# Patient Record
Sex: MALE | Race: WHITE | NOT HISPANIC OR LATINO | Employment: FULL TIME | URBAN - METROPOLITAN AREA
[De-identification: names, ages, dates, MRNs, and addresses within clinical notes are randomized per-mention and may not be internally consistent; named-entity substitution may affect disease eponyms.]

---

## 2017-01-11 DIAGNOSIS — N52.9 ERECTILE DYSFUNCTION, UNSPECIFIED ERECTILE DYSFUNCTION TYPE: Primary | ICD-10-CM

## 2017-01-11 NOTE — TELEPHONE ENCOUNTER
----- Message from Juliann Sams sent at 1/11/2017 10:21 AM CST -----  Contact: [facility]   _x 1st Request  _  2nd Request  _  3rd Request    Please refill the medication(s) listed below. Please call the patient when the prescription(s) is ready for  at the phone number [phone].    Medication #1 CIALIS 20 mg Tab    Preferred Pharmacy:  [facility] [address] [place] [address] & [address] [address]  Phone: [phone] Fax: [phone]

## 2017-01-23 RX ORDER — TADALAFIL 20 MG/1
TABLET ORAL
Qty: 10 TABLET | Refills: 2 | Status: SHIPPED | OUTPATIENT
Start: 2017-01-23 | End: 2017-03-06 | Stop reason: SDUPTHER

## 2017-01-23 RX ORDER — TADALAFIL 20 MG/1
20 TABLET ORAL DAILY
Qty: 10 TABLET | Refills: 0 | Status: SHIPPED | OUTPATIENT
Start: 2017-01-23 | End: 2017-03-06 | Stop reason: SDUPTHER

## 2017-02-15 DIAGNOSIS — N52.9 ERECTILE DYSFUNCTION, UNSPECIFIED ERECTILE DYSFUNCTION TYPE: Primary | ICD-10-CM

## 2017-02-15 RX ORDER — TADALAFIL 20 MG/1
20 TABLET ORAL DAILY
Qty: 10 TABLET | Refills: 0 | OUTPATIENT
Start: 2017-02-15

## 2017-02-15 RX ORDER — TADALAFIL 20 MG/1
TABLET, FILM COATED ORAL
Qty: 10 TABLET | Refills: 0 | OUTPATIENT
Start: 2017-02-15

## 2017-02-15 NOTE — TELEPHONE ENCOUNTER
----- Message from Gracy Tuttle sent at 2/15/2017  4:32 PM CST -----  Contact: Self  X   1st Request  _  2nd Request  _  3rd Request        Who: JOLANTA MCCABE [[id number]]    Why: Pt states he is returning a call to the clinical team. Please call, thanks!    What Number to Call Back: [phone]    When to Expect a call back: (Before the end of the day)   -- if the call is after 12:00, the call back will be tomorrow.

## 2017-02-15 NOTE — TELEPHONE ENCOUNTER
I left message on recorder that pt should make appt for refills.  He has not seen you in 11 months.

## 2017-02-15 NOTE — TELEPHONE ENCOUNTER
----- Message from Nani Rice sent at 2/15/2017  1:36 PM CST -----  _  1st Request  _  2nd Request  _  3rd Request    Please refill the medication(s) listed below.    Medication #1tadalafil (CIALIS) 20 MG Tab          Preferred Pharmacy:[phone]

## 2017-03-06 ENCOUNTER — OFFICE VISIT (OUTPATIENT)
Dept: UROLOGY | Facility: CLINIC | Age: 47
End: 2017-03-06
Attending: UROLOGY
Payer: COMMERCIAL

## 2017-03-06 VITALS
BODY MASS INDEX: 30.22 KG/M2 | WEIGHT: 228 LBS | HEART RATE: 69 BPM | SYSTOLIC BLOOD PRESSURE: 130 MMHG | DIASTOLIC BLOOD PRESSURE: 82 MMHG | HEIGHT: 73 IN

## 2017-03-06 DIAGNOSIS — N52.9 ERECTILE DYSFUNCTION, UNSPECIFIED ERECTILE DYSFUNCTION TYPE: ICD-10-CM

## 2017-03-06 PROCEDURE — 1160F RVW MEDS BY RX/DR IN RCRD: CPT | Mod: S$GLB,,, | Performed by: NURSE PRACTITIONER

## 2017-03-06 PROCEDURE — 99213 OFFICE O/P EST LOW 20 MIN: CPT | Mod: S$GLB,,, | Performed by: NURSE PRACTITIONER

## 2017-03-06 PROCEDURE — 99999 PR PBB SHADOW E&M-EST. PATIENT-LVL III: CPT | Mod: PBBFAC,,, | Performed by: UROLOGY

## 2017-03-06 RX ORDER — TADALAFIL 20 MG/1
TABLET ORAL
Qty: 10 TABLET | Refills: 11 | Status: SHIPPED | OUTPATIENT
Start: 2017-03-06 | End: 2017-03-28

## 2017-03-06 NOTE — PROGRESS NOTES
"Subjective:      Gonzalo Bustamante is a 46 y.o. male who returns today regarding his ED.      Erectile Dysfunction  Patient complains of erectile dysfunction. Onset of dysfunction was 5 years ago and was gradual in onset. Patient states the nature of difficulty is both attaining and maintaining erection. Full erections occur with Cialis. Libido is not affected. Risk factors for ED include none. Patient denies history of cardiovascular disease and diabetes mellitus. Patient's expectations as to sexual function is to be normal. Previous treatment of ED includes Cialis, which has worked well.       The patient presents today for follow up for his ED. He is doing well on Cialis. He denies any SE from the medication as well as any urinary symptoms.     The following portions of the patient's history were reviewed and updated as appropriate: allergies, current medications, past family history, past medical history, past social history, past surgical history and problem list.    Review of Systems  A comprehensive multipoint review of systems was negative except as otherwise stated in the HPI.     Objective:   Vitals: /82 (BP Location: Right arm, Patient Position: Sitting, BP Method: Automatic)  Pulse 69  Ht 6' 1" (1.854 m)  Wt 103.4 kg (228 lb)  BMI 30.08 kg/m2    Physical Exam   General: alert and oriented, no acute distress  Respiratory: Symmetric expansion, non-labored breathing  Cardiovascular: regular rate and rhythm, no peripheral edema  Abdomen: soft, non distended  Genitourinary: not examined  Rectal: not examined  Skin: normal coloration and turgor, no rashes, no suspicious skin lesions noted  Neuro: no gross deficits  Psych: normal judgment and insight, normal mood/affect and non-anxious    Lab Review   Urinalysis demonstrates- no sample today   Lab Results   Component Value Date    WBC 9.55 01/17/2009    HGB 16.0 01/17/2009    HCT 48.7 01/17/2009    MCV 90.0 01/17/2009     01/17/2009     Lab " Results   Component Value Date    CREATININE 1.2 01/17/2009    BUN 20 01/17/2009     No results found for: PSA, PSADIAG    Imaging   None    Assessment and Plan:   Gonzalo was seen today for other.    Diagnoses and all orders for this visit:    Erectile dysfunction, unspecified erectile dysfunction type  -     tadalafil (CIALIS) 20 MG Tab; Take 1 tablet daily as needed      Plan: Continue cialis, refilled today. Again encouraged patient to stop smoking as this may improve his ED and overall health. Follow up in 1 year.

## 2017-03-09 RX ORDER — TADALAFIL 20 MG/1
TABLET, FILM COATED ORAL
Qty: 10 TABLET | Refills: 0 | Status: SHIPPED | OUTPATIENT
Start: 2017-03-09 | End: 2017-03-27 | Stop reason: SDUPTHER

## 2017-03-28 RX ORDER — TADALAFIL 20 MG/1
TABLET, FILM COATED ORAL
Qty: 10 TABLET | Refills: 0 | Status: SHIPPED | OUTPATIENT
Start: 2017-03-28 | End: 2017-04-12 | Stop reason: SDUPTHER

## 2017-04-13 RX ORDER — TADALAFIL 20 MG/1
TABLET, FILM COATED ORAL
Qty: 10 TABLET | Refills: 0 | Status: SHIPPED | OUTPATIENT
Start: 2017-04-13 | End: 2017-06-01 | Stop reason: SDUPTHER

## 2017-06-01 RX ORDER — TADALAFIL 20 MG/1
TABLET, FILM COATED ORAL
Qty: 10 TABLET | Refills: 0 | Status: SHIPPED | OUTPATIENT
Start: 2017-06-01 | End: 2017-07-24

## 2017-06-28 RX ORDER — TADALAFIL 20 MG/1
TABLET, FILM COATED ORAL
Qty: 10 TABLET | Refills: 0 | Status: SHIPPED | OUTPATIENT
Start: 2017-06-28 | End: 2017-07-24 | Stop reason: SDUPTHER

## 2017-07-24 RX ORDER — TADALAFIL 20 MG/1
TABLET, FILM COATED ORAL
Qty: 10 TABLET | Refills: 0 | Status: SHIPPED | OUTPATIENT
Start: 2017-07-24 | End: 2017-08-13 | Stop reason: SDUPTHER

## 2017-07-26 RX ORDER — LEVOTHYROXINE SODIUM 200 UG/1
TABLET ORAL
Qty: 30 TABLET | Refills: 11 | Status: SHIPPED | OUTPATIENT
Start: 2017-07-26 | End: 2018-09-05 | Stop reason: SDUPTHER

## 2017-08-14 RX ORDER — TADALAFIL 20 MG/1
TABLET, FILM COATED ORAL
Qty: 10 TABLET | Refills: 0 | Status: SHIPPED | OUTPATIENT
Start: 2017-08-14 | End: 2017-09-01 | Stop reason: SDUPTHER

## 2017-09-01 RX ORDER — TADALAFIL 20 MG/1
TABLET, FILM COATED ORAL
Qty: 10 TABLET | Refills: 0 | Status: SHIPPED | OUTPATIENT
Start: 2017-09-01 | End: 2017-09-24 | Stop reason: SDUPTHER

## 2017-09-25 RX ORDER — TADALAFIL 20 MG/1
TABLET, FILM COATED ORAL
Qty: 10 TABLET | Refills: 11 | Status: SHIPPED | OUTPATIENT
Start: 2017-09-25 | End: 2018-03-06 | Stop reason: SDUPTHER

## 2018-03-06 RX ORDER — TADALAFIL 20 MG/1
TABLET, FILM COATED ORAL
Qty: 10 TABLET | Refills: 0 | Status: SHIPPED | OUTPATIENT
Start: 2018-03-06 | End: 2018-03-23 | Stop reason: SDUPTHER

## 2018-03-23 RX ORDER — TADALAFIL 20 MG/1
TABLET, FILM COATED ORAL
Qty: 10 TABLET | Refills: 0 | Status: SHIPPED | OUTPATIENT
Start: 2018-03-23 | End: 2018-04-05 | Stop reason: SDUPTHER

## 2018-04-05 RX ORDER — TADALAFIL 20 MG/1
TABLET, FILM COATED ORAL
Qty: 10 TABLET | Refills: 0 | Status: SHIPPED | OUTPATIENT
Start: 2018-04-05 | End: 2018-04-23 | Stop reason: SDUPTHER

## 2018-04-24 RX ORDER — TADALAFIL 20 MG/1
TABLET, FILM COATED ORAL
Qty: 10 TABLET | Refills: 0 | Status: SHIPPED | OUTPATIENT
Start: 2018-04-24 | End: 2018-05-14 | Stop reason: SDUPTHER

## 2018-05-14 RX ORDER — TADALAFIL 20 MG/1
TABLET, FILM COATED ORAL
Qty: 10 TABLET | Refills: 0 | Status: SHIPPED | OUTPATIENT
Start: 2018-05-14 | End: 2018-07-03 | Stop reason: SDUPTHER

## 2018-05-14 RX ORDER — TADALAFIL 20 MG/1
TABLET, FILM COATED ORAL
Qty: 10 TABLET | Refills: 0 | Status: SHIPPED | OUTPATIENT
Start: 2018-05-14 | End: 2018-08-22 | Stop reason: SDUPTHER

## 2018-06-07 ENCOUNTER — TELEPHONE (OUTPATIENT)
Dept: ENDOCRINOLOGY | Facility: CLINIC | Age: 48
End: 2018-06-07

## 2018-06-07 NOTE — TELEPHONE ENCOUNTER
Dr. Stevenson, would you like to order any labs to be done prior to appointment with you in October 2018?  Attempted to call patient, no answer- left detailed message that, as soon as Dr. Stevenson puts in the ordered- I will schedule and sent an appointment reminder letter.

## 2018-06-07 NOTE — TELEPHONE ENCOUNTER
----- Message from Stephanie Sweet sent at 6/7/2018  9:58 AM CDT -----  Contact: Self  Pt is requesting to speak with Staff regarding an order for blood work for his thyroid condition.  Pt says he has not seen Dr. Stevenson in sometime and is scheduled to be seen in October (first available), but would to have the blood work for Dr. Stevenson to look at, prior to the appt.    He can be reached at [phone].    Thank you.

## 2018-06-18 ENCOUNTER — TELEPHONE (OUTPATIENT)
Dept: ENDOCRINOLOGY | Facility: CLINIC | Age: 48
End: 2018-06-18

## 2018-06-18 DIAGNOSIS — E89.0 HYPOTHYROIDISM FOLLOWING RADIOIODINE THERAPY: Primary | ICD-10-CM

## 2018-07-03 RX ORDER — TADALAFIL 20 MG/1
TABLET, FILM COATED ORAL
Qty: 10 TABLET | Refills: 0 | Status: SHIPPED | OUTPATIENT
Start: 2018-07-03 | End: 2018-07-29 | Stop reason: SDUPTHER

## 2018-07-30 RX ORDER — TADALAFIL 20 MG/1
TABLET, FILM COATED ORAL
Qty: 10 TABLET | Refills: 0 | Status: SHIPPED | OUTPATIENT
Start: 2018-07-30 | End: 2018-08-22 | Stop reason: SDUPTHER

## 2018-08-13 RX ORDER — TADALAFIL 20 MG/1
TABLET, FILM COATED ORAL
Qty: 10 TABLET | Refills: 0 | OUTPATIENT
Start: 2018-08-13

## 2018-08-22 ENCOUNTER — OFFICE VISIT (OUTPATIENT)
Dept: UROLOGY | Facility: CLINIC | Age: 48
End: 2018-08-22
Attending: UROLOGY
Payer: COMMERCIAL

## 2018-08-22 VITALS
HEIGHT: 73 IN | BODY MASS INDEX: 30.22 KG/M2 | HEART RATE: 64 BPM | DIASTOLIC BLOOD PRESSURE: 102 MMHG | WEIGHT: 228 LBS | SYSTOLIC BLOOD PRESSURE: 145 MMHG

## 2018-08-22 DIAGNOSIS — N52.9 ERECTILE DYSFUNCTION, UNSPECIFIED ERECTILE DYSFUNCTION TYPE: Primary | ICD-10-CM

## 2018-08-22 PROCEDURE — 3008F BODY MASS INDEX DOCD: CPT | Mod: CPTII,S$GLB,, | Performed by: UROLOGY

## 2018-08-22 PROCEDURE — 99213 OFFICE O/P EST LOW 20 MIN: CPT | Mod: S$GLB,,, | Performed by: UROLOGY

## 2018-08-22 RX ORDER — TADALAFIL 20 MG/1
20 TABLET ORAL DAILY PRN
Qty: 10 TABLET | Refills: 11 | Status: SHIPPED | OUTPATIENT
Start: 2018-08-22 | End: 2019-08-24 | Stop reason: SDUPTHER

## 2018-08-22 NOTE — PROGRESS NOTES
"Subjective:      Gonzalo Bustamante is a 47 y.o. male who returns today regarding his ED.    Doing well w/ cialis and wishes to continue. No c/o.    The following portions of the patient's history were reviewed and updated as appropriate: allergies, current medications, past family history, past medical history, past social history, past surgical history and problem list.    Review of Systems  A comprehensive multipoint review of systems was negative except as otherwise stated in the HPI.     Objective:   Vitals: BP (!) 145/102 (BP Location: Left arm, Patient Position: Sitting, BP Method: Large (Automatic)) Comment: per pt he had 3 cups of coffee and he doesnt feel any sympts  Pulse 64   Ht 6' 1" (1.854 m)   Wt 103.4 kg (228 lb)   BMI 30.08 kg/m²     Physical Exam   General: alert and oriented, no acute distress  Respiratory: Symmetric expansion, non-labored breathing  Neuro: no gross deficits  Psych: normal judgment and insight, normal mood/affect and non-anxious    Lab Review     Lab Results   Component Value Date    WBC 9.55 01/17/2009    HGB 16.0 01/17/2009    HCT 48.7 01/17/2009    MCV 90.0 01/17/2009     01/17/2009     Lab Results   Component Value Date    CREATININE 1.2 01/17/2009    BUN 20 01/17/2009         Assessment and Plan:   1. Erectile dysfunction, unspecified erectile dysfunction type  -- Continue cialis  -- FU 1 year     "

## 2018-09-05 DIAGNOSIS — E03.9 HYPOTHYROIDISM, UNSPECIFIED TYPE: Primary | ICD-10-CM

## 2018-09-05 RX ORDER — LEVOTHYROXINE SODIUM 200 UG/1
200 TABLET ORAL DAILY
Qty: 30 TABLET | Refills: 3 | Status: SHIPPED | OUTPATIENT
Start: 2018-09-05 | End: 2019-01-08 | Stop reason: SDUPTHER

## 2018-09-05 NOTE — TELEPHONE ENCOUNTER
----- Message from Michaela Joseph sent at 9/5/2018 11:23 AM CDT -----  Contact: Self  .Rx Refill/Request     Is this a Refill or New Rx:  Refill  Rx Name and Strength: SYNTHROID 200 mcg tablet   [facility] with [facility] [place] [address], [phone] Fax: [phone]  Communication Preference:  Additional Information: Pt is out for 3 days

## 2018-10-22 ENCOUNTER — TELEPHONE (OUTPATIENT)
Dept: ENDOCRINOLOGY | Facility: CLINIC | Age: 48
End: 2018-10-22

## 2018-10-22 NOTE — TELEPHONE ENCOUNTER
Called patient, patient answered, then phone disconnected, tried to call back - No answer    ----- Message from Nubia Bronson sent at 10/22/2018 10:01 AM CDT -----  Contact: Pt      ----- Message -----  From: Jayne Bolden  Sent: 10/22/2018   9:45 AM  To: Graham NELA III Staff    Pt is requesting a callback from office need to r/s appt looking to come in the afternoon on a different day     Attempted to r/s no available appt    Pt can be reached at [phone]    Thanks

## 2018-11-07 ENCOUNTER — LAB VISIT (OUTPATIENT)
Dept: LAB | Facility: OTHER | Age: 48
End: 2018-11-07
Attending: INTERNAL MEDICINE
Payer: COMMERCIAL

## 2018-11-07 DIAGNOSIS — E89.0 HYPOTHYROIDISM FOLLOWING RADIOIODINE THERAPY: ICD-10-CM

## 2018-11-07 LAB
T4 FREE SERPL-MCNC: 0.98 NG/DL
TSH SERPL DL<=0.005 MIU/L-ACNC: 5.7 UIU/ML

## 2018-11-07 PROCEDURE — 84439 ASSAY OF FREE THYROXINE: CPT

## 2018-11-07 PROCEDURE — 36415 COLL VENOUS BLD VENIPUNCTURE: CPT

## 2018-11-07 PROCEDURE — 84443 ASSAY THYROID STIM HORMONE: CPT

## 2018-11-14 ENCOUNTER — TELEPHONE (OUTPATIENT)
Dept: ENDOCRINOLOGY | Facility: CLINIC | Age: 48
End: 2018-11-14

## 2018-11-14 NOTE — TELEPHONE ENCOUNTER
----- Message from Bibiana Clark sent at 11/14/2018  3:00 PM CST -----  Contact: Self   Pt is requesting a call back in regards to rescheduling his appt. Pt is very concerned and would like to speak with someone as soon as possible.       Pt can be contacted at [phone]

## 2018-11-16 ENCOUNTER — TELEPHONE (OUTPATIENT)
Dept: ENDOCRINOLOGY | Facility: CLINIC | Age: 48
End: 2018-11-16

## 2018-11-16 NOTE — TELEPHONE ENCOUNTER
----- Message from Maty Marcial sent at 11/16/2018  3:33 PM CST -----  Contact: Self [phone]   PT returned call.

## 2018-12-10 ENCOUNTER — OFFICE VISIT (OUTPATIENT)
Dept: ENDOCRINOLOGY | Facility: CLINIC | Age: 48
End: 2018-12-10
Payer: COMMERCIAL

## 2018-12-10 VITALS
DIASTOLIC BLOOD PRESSURE: 92 MMHG | HEIGHT: 73 IN | BODY MASS INDEX: 32.58 KG/M2 | RESPIRATION RATE: 20 BRPM | HEART RATE: 76 BPM | WEIGHT: 245.81 LBS | SYSTOLIC BLOOD PRESSURE: 138 MMHG

## 2018-12-10 DIAGNOSIS — E89.0 HYPOTHYROIDISM FOLLOWING RADIOIODINE THERAPY: Primary | ICD-10-CM

## 2018-12-10 PROCEDURE — 99204 OFFICE O/P NEW MOD 45 MIN: CPT | Mod: S$GLB,,, | Performed by: INTERNAL MEDICINE

## 2018-12-10 PROCEDURE — 3008F BODY MASS INDEX DOCD: CPT | Mod: CPTII,S$GLB,, | Performed by: INTERNAL MEDICINE

## 2018-12-10 PROCEDURE — 99999 PR PBB SHADOW E&M-EST. PATIENT-LVL III: CPT | Mod: PBBFAC,,, | Performed by: INTERNAL MEDICINE

## 2018-12-10 RX ORDER — CEFDINIR 300 MG/1
CAPSULE ORAL
Refills: 0 | COMMUNITY
Start: 2018-12-08 | End: 2019-02-08 | Stop reason: CLARIF

## 2018-12-10 RX ORDER — BENZONATATE 200 MG/1
CAPSULE ORAL
Refills: 0 | COMMUNITY
Start: 2018-12-08 | End: 2019-02-08 | Stop reason: CLARIF

## 2018-12-10 NOTE — PROGRESS NOTES
"The patient comes in for a thyroid checkup. He is on 200 mcg of thyroid      once a day. He has no complaints, other than he has lost weight since last   year. He has lost approximately 15 pounds. He did this with some increase    exercise and eating healthy, but he did not deliberately try to lose 15      pounds. 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  He was treated with I-131 for hyperthyroidism in 1991.                                                                   WEIGHT GAIN OF 20 POUNDS PAST 2 YEARS.                                                                             PAST MEDICAL HISTORY: See note dated 03/01/06, no significant change.                                                                                     REVIEW OF SYSTEMS: HEENT: Good eyesight and hearing. CARDIOPULMONARY:        Denies chest pain, cough or shortness of breath. GI: Denies nausea,          vomiting, diarrhea, constipation or abdominal pain. : Denies dysuria,      hematuria or nocturia. NEUROMUSCULAR: Denies numbness, tingling or           paresthesias.    All other systems reviewed and are negative except for mild fatigue.                                                                Physical  BMI 32.43                                                                              111.5 kg (245 lb 13 oz), 6' 1" (1.854 m)  76bpm, (!) 138/92  EYES: No eye signs of Graves disease. No scleral icterus. No arcus senilis.  ENT: No lesions on tongue, hard palate, soft palate or pharynx.              NECK: FULLNESS RIGHT LOBE thyromegaly. No neck vein distention. No adenopathy.                HEART: Normal sinus rhythm. No murmurs. No rubs.                             LUNGS: Clear. Percussion normal. No respiratory difficulty.                  ABDOMINAL EXAM: No masses, tenderness or organomegaly.                       EXTREMITIES: No clubbing, cyanosis, edema or tremor.                   "       Results for orders placed or performed in visit on 11/07/18   TSH   Result Value Ref Range    TSH 5.695 (H) 0.400 - 4.000 uIU/mL   T4, free   Result Value Ref Range    Free T4 0.98 0.71 - 1.51 ng/dL                                                                                IMPRESSION: Post [id number] hypothyroidism.   Hx of malignant melanoma resection.                                                                              RECOMMENDATIONS: Thyroid us because of fullness right lower lobe.

## 2018-12-17 ENCOUNTER — HOSPITAL ENCOUNTER (OUTPATIENT)
Dept: RADIOLOGY | Facility: OTHER | Age: 48
Discharge: HOME | End: 2018-12-17
Attending: INTERNAL MEDICINE
Payer: COMMERCIAL

## 2018-12-17 DIAGNOSIS — E89.0 HYPOTHYROIDISM FOLLOWING RADIOIODINE THERAPY: ICD-10-CM

## 2018-12-17 PROCEDURE — 76536 US EXAM OF HEAD AND NECK: CPT | Mod: TC

## 2018-12-17 PROCEDURE — 76536 US EXAM OF HEAD AND NECK: CPT | Mod: 26,,, | Performed by: RADIOLOGY

## 2019-01-08 DIAGNOSIS — E03.9 HYPOTHYROIDISM, UNSPECIFIED TYPE: ICD-10-CM

## 2019-01-08 RX ORDER — LEVOTHYROXINE SODIUM 200 UG/1
TABLET ORAL
Qty: 30 TABLET | Refills: 0 | Status: SHIPPED | OUTPATIENT
Start: 2019-01-08 | End: 2019-02-08 | Stop reason: SDUPTHER

## 2019-02-06 ENCOUNTER — ANESTHESIA EVENT (OUTPATIENT)
Dept: SURGERY | Facility: HOSPITAL | Age: 49
End: 2019-02-06
Payer: COMMERCIAL

## 2019-02-08 ENCOUNTER — HOSPITAL ENCOUNTER (OUTPATIENT)
Dept: PREADMISSION TESTING | Facility: HOSPITAL | Age: 49
Discharge: HOME | End: 2019-02-08
Attending: PODIATRIST
Payer: COMMERCIAL

## 2019-02-08 ENCOUNTER — OFFICE VISIT (OUTPATIENT)
Dept: INTERNAL MEDICINE | Facility: CLINIC | Age: 49
End: 2019-02-08
Payer: COMMERCIAL

## 2019-02-08 VITALS
WEIGHT: 245.94 LBS | DIASTOLIC BLOOD PRESSURE: 89 MMHG | OXYGEN SATURATION: 98 % | HEART RATE: 78 BPM | BODY MASS INDEX: 32.6 KG/M2 | SYSTOLIC BLOOD PRESSURE: 125 MMHG | HEIGHT: 73 IN

## 2019-02-08 DIAGNOSIS — E03.9 HYPOTHYROIDISM, UNSPECIFIED TYPE: ICD-10-CM

## 2019-02-08 DIAGNOSIS — Z01.818 PREOPERATIVE EXAMINATION: Primary | ICD-10-CM

## 2019-02-08 DIAGNOSIS — M86.271 SUBACUTE OSTEOMYELITIS OF RIGHT FOOT: ICD-10-CM

## 2019-02-08 PROCEDURE — 99203 PR OFFICE/OUTPT VISIT, NEW, LEVL III, 30-44 MIN: ICD-10-PCS | Mod: S$GLB,,, | Performed by: FAMILY MEDICINE

## 2019-02-08 PROCEDURE — 99999 PR PBB SHADOW E&M-EST. PATIENT-LVL III: CPT | Mod: PBBFAC,,, | Performed by: FAMILY MEDICINE

## 2019-02-08 PROCEDURE — 99999 PR PBB SHADOW E&M-EST. PATIENT-LVL III: ICD-10-PCS | Mod: PBBFAC,,, | Performed by: FAMILY MEDICINE

## 2019-02-08 PROCEDURE — 3008F PR BODY MASS INDEX (BMI) DOCUMENTED: ICD-10-PCS | Mod: CPTII,S$GLB,, | Performed by: FAMILY MEDICINE

## 2019-02-08 PROCEDURE — 99203 OFFICE O/P NEW LOW 30 MIN: CPT | Mod: S$GLB,,, | Performed by: FAMILY MEDICINE

## 2019-02-08 PROCEDURE — 3008F BODY MASS INDEX DOCD: CPT | Mod: CPTII,S$GLB,, | Performed by: FAMILY MEDICINE

## 2019-02-08 RX ORDER — SODIUM CHLORIDE, SODIUM LACTATE, POTASSIUM CHLORIDE, CALCIUM CHLORIDE 600; 310; 30; 20 MG/100ML; MG/100ML; MG/100ML; MG/100ML
INJECTION, SOLUTION INTRAVENOUS CONTINUOUS
Status: CANCELLED | OUTPATIENT
Start: 2019-02-08

## 2019-02-08 RX ORDER — CIPROFLOXACIN 500 MG/1
500 TABLET ORAL
COMMUNITY
End: 2021-09-29

## 2019-02-08 RX ORDER — LIDOCAINE HYDROCHLORIDE 10 MG/ML
1 INJECTION, SOLUTION EPIDURAL; INFILTRATION; INTRACAUDAL; PERINEURAL ONCE
Status: CANCELLED | OUTPATIENT
Start: 2019-02-08 | End: 2019-02-08

## 2019-02-08 RX ORDER — AMOXICILLIN AND CLAVULANATE POTASSIUM 875; 125 MG/1; MG/1
1 TABLET, FILM COATED ORAL
COMMUNITY
End: 2021-09-29

## 2019-02-08 NOTE — DISCHARGE INSTRUCTIONS
Your surgery is scheduled for 2/11/19.    Please report to Outpatient Surgery Intake Office on the 2nd FLOOR at 5:30 a.m.          INSTRUCTIONS IMPORTANT!!!  ¨ Do not eat or drink after 12 midnight-including water. OK to brush teeth, no   gum, candy or mints!    ¨ Take only these medicines with a small swallow of water-morning of surgery: Synthroid          ____  No powder, lotions or creams to surgical area.  ____  Please remove all jewelry, including piercings and leave at home.  ____  No money or valuables needed. Please leave at home.  ____  Please bring any documents given by your doctor.  ____  If going home the same day, arrange for a ride home. You will not be able to             drive if Anesthesia was used.  ____  Wear loose fitting clothing. Allow for dressings, bandages.  ____  Stop Aspirin, Ibuprofen, Motrin and Aleve at least 3-5 days before surgery, unless otherwise instructed by your doctor, or the nurse.   You MAY use Tylenol/acetaminophen until day of surgery.  ____  Wash the surgical area with Hibiclens the night before surgery, and again the             morning of surgery.  Be sure to rinse hibiclens off completely (if instructed by   nurse).  ____  If you take diabetic medication, do not take am of surgery unless instructed by Doctor.  ____  Call MD for temperature above 101 degrees or any other signs of infection such as Urinary (bladder) infection, Upper respiratory infection, skin boils, etc.  ____ Stop taking any Fish Oil supplement or any Vitamins that contain Vitamin E at least 5 days prior to surgery.  ____ Do Not wear your contact lenses the day of your procedure.  You may wear your glasses.      ____Do not shave for 3 days prior to surgery.  ____ Practice Good hand washing before, during, and after procedure.      I have read or had read and explained to me, and understand the above information.  Additional comments or instructions:  For additional questions call [phone]      Pre-Op  Bathing Instructions    Before surgery, you can play an important role in your own health.    Because skin is not sterile, we need to be sure that your skin is as free of germs as possible. By following the instructions below, you can reduce the number of germs on your skin before surgery.    IMPORTANT: You will need to shower with a special soap called Hibiclens*, available at any pharmacy.  If you are allergic to Chlorhexidine (the antiseptic in Hibiclens), use an antibacterial soap such as Dial Soap for your preoperative shower.  You will shower with Hibiclens both the night before your surgery and the morning of your surgery.  Do not use Hibiclens on the head, face or genitals to avoid injury to those areas.    STEP #1: THE NIGHT BEFORE YOUR SURGERY     1. Do not shave the area of your body where your surgery will be performed.  2. Shower and wash your hair and body as usual with your normal soap and shampoo.  3. Rinse your hair and body thoroughly after you shower to remove all soap residue.  4. With your hand, apply one packet of Hibiclens soap to the surgical site.   5. Wash the site gently for five (5) minutes. Do not scrub your skin too hard.   6. Do not wash with your regular soap after Hibiclens is used.  7. Rinse your body thoroughly.  8. Pat yourself dry with a clean, soft towel.  9. Do not use lotion, cream, or powder.  10. Wear clean clothes.    STEP #2: THE MORNING OF YOUR SURGERY     1. Repeat Step #1.    * Not to be used by people allergic to Chlorhexidine.

## 2019-02-08 NOTE — ANESTHESIA PREPROCEDURE EVALUATION
02/08/2019  Gonzalo Bustamante is a 48 y.o., male scheduled for right 3rd toe I&D with antibiotic implant insertion and bone biopsy under MAC on 2/11/19.    Needs PCP H&P.    Past Medical History:   Diagnosis Date    Graves disease     Hypothyroidism      Past Surgical History:   Procedure Laterality Date    BUNIONECTOMY Bilateral     SKIN CANCER EXCISION      melanoma back 2012    WISDOM TOOTH EXTRACTION       Anesthesia Evaluation    I have reviewed the Patient Summary Reports.    I have reviewed the Nursing Notes.   I have reviewed the Medications.     Review of Systems  Anesthesia Hx:  No problems with previous Anesthesia    Social:  Smoker, Social Alcohol Use    Hematology/Oncology:  Hematology Normal        Cardiovascular:  Cardiovascular Normal Exercise tolerance: good   Denies Angina.        Pulmonary:  Pulmonary Normal    Renal/:  Renal/ Normal     Hepatic/GI:  Hepatic/GI Normal Denies Liver Disease.    Neurological:  Neurology Normal Denies Seizures.    Endocrine:   Hypothyroidism        Physical Exam  General:  Well nourished    Airway/Jaw/Neck:  Airway Findings: Mouth Opening: Normal Tongue: Normal  General Airway Assessment: Adult  Mallampati: II       Chest/Lungs:  Chest/Lungs Findings: Clear to auscultation, Normal Respiratory Rate     Heart/Vascular:  Heart Findings: Rate: Normal  Rhythm: Regular Rhythm  Sounds: Normal  Heart murmur: negative       Mental Status:  Mental Status Findings:  Cooperative, Alert and Oriented         Anesthesia Plan  Type of Anesthesia, risks & benefits discussed:  Anesthesia Type:  MAC, regional, general  Patient's Preference:   Intra-op Monitoring Plan: standard ASA monitors  Intra-op Monitoring Plan Comments:   Post Op Pain Control Plan: per primary service following discharge from PACU  Post Op Pain Control Plan Comments:   Induction:   IV  Beta  Blocker:  Patient is not currently on a Beta-Blocker (No further documentation required).       Informed Consent: Patient understands risks and agrees with Anesthesia plan.  Questions answered. Anesthesia consent signed with patient.  ASA Score: 2     Day of Surgery Review of History & Physical:     H&P completed by Anesthesiologist.   Anesthesia Plan Notes:           Ready For Surgery From Anesthesia Perspective.

## 2019-02-08 NOTE — PROGRESS NOTES
"[facility]  Clinic Note      Subjective:       Patient ID: Gonzalo Bustamante is a 48 y.o. male.    Chief Complaint: Pre-op Exam    Patient is here today for preoperative clearance.  He developed an infection on the 3rd digit of his right foot, which subsequent evaluation has revealed progression to osteomyelitis.  He denies any active fever.  He is taking Augmentin and Cipro, with good improvement noted on these antibiotics.  He describes his condition has improved such that surgery is planned for 3 days from now at the [facility].  There is plan for potential bony debridement but hopefully will preserve the toe.  He had bloodwork recently showing normal blood sugars and renal function.  He is chronic hypothyroid, well-controlled on Synthroid and followed regularly by his Endocrinologist.  He has no history of CAD/MI, and notes he is able to climb a flight of stairs without any cardiopulmonary symptoms.      Review of Systems   Constitutional: Negative for fatigue and fever.   HENT: Negative for congestion, rhinorrhea and sore throat.    Respiratory: Negative for cough and shortness of breath.    Cardiovascular: Negative for chest pain and palpitations.   Gastrointestinal: Negative for abdominal pain, diarrhea, nausea and vomiting.   Skin: Positive for rash and wound.   Neurological: Negative for dizziness, syncope and headaches.       Objective:      /89   Pulse 78   Ht 6' 1" (1.854 m)   Wt 111.5 kg (245 lb 14.8 oz)   SpO2 98%   BMI 32.45 kg/m²   Physical Exam   Constitutional: He is oriented to person, place, and time. He appears well-developed and well-nourished. No distress.   HENT:   Head: Normocephalic and atraumatic.   Right Ear: Tympanic membrane and ear canal normal. Tympanic membrane is not erythematous. No middle ear effusion.   Left Ear: Tympanic membrane and ear canal normal. Tympanic membrane is not erythematous.  No middle ear effusion.   Nose: Nose normal. No " mucosal edema or rhinorrhea.   Mouth/Throat: Oropharynx is clear and moist and mucous membranes are normal.   Eyes: Conjunctivae are normal. Pupils are equal, round, and reactive to light.   Neck: Normal range of motion. No thyromegaly present.   Cardiovascular: Normal rate and regular rhythm.   No murmur heard.  Pulmonary/Chest: Effort normal and breath sounds normal. No respiratory distress. He has no wheezes. He has no rales.   Abdominal: Soft. Bowel sounds are normal. He exhibits no distension. There is no tenderness. There is no guarding.   Musculoskeletal: Normal range of motion. He exhibits no edema.   Lymphadenopathy:     He has no cervical adenopathy.   Neurological: He is alert and oriented to person, place, and time. No cranial nerve deficit.   Skin: Skin is warm and dry. Rash noted.   Focal wound and infection on medial aspect of right 3rd toe, no proximal erythema or swelling, some surrounding induration and maceration, no active purulence   Psychiatric: He has a normal mood and affect. His behavior is normal.   Nursing note and vitals reviewed.      Assessment:       1. Preoperative examination    2. Subacute osteomyelitis of right foot        Plan:     1. Preoperative examination  2. Subacute osteomyelitis of right foot  - health history reviewed  - normal exam today aside from toe infection, at baseline health, no recent fever  - continue antibiotic as directed  - preoperative form reviewed, has indicated surgery in 3 days with MAC anesthesia  - recent labs showing good renal function and no diabetes  - OK for upcoming surgery, no additional diagnostics required  - form completed for surgeon, will fax back along with a copy of today's note  - questions answered, patient is comfortable with this plan and was encouraged to call the office for any additional needs or concerns     - Follow-up if symptoms worsen or fail to improve.     Frank Ramos MD  2/8/2019

## 2019-02-08 NOTE — PRE-PROCEDURE INSTRUCTIONS
Spoke with Zoe at Dr. Cifuentes office to see if pt is still having surgery on Monday 2/11.  She states she spoke with the pt and told him she would give him till 3:30pm today to obtain medical clearance or he will be cancelled.  Zoe will let us know this evening.

## 2019-02-08 NOTE — PRE-PROCEDURE INSTRUCTIONS
"Pt states he faxed the clearance forms to his "PCP", Dr. Sheets who is an [facility] endocrinologist.  There are no encounters or notes from Dr. Sheets office stating that the pt will need to be seen or is cleared for upcoming surgery.  Pt also states he actually does not have a PCP.  Pt states he did not attempt to contact by phone Dr. Sehets office to obtain clearance or visit for clearance.      Zoe at Dr. Cifuentes office notified of the above and states that the pt requires medical clearance and cannot proceed with procedure until clearance is obtained.  Instructed me to have the pt call her as soon as he is done with anesthesia NP to try to obtain a visit for clearance.  Pt instructed that he needs to call Dr. Cifuentes office and speak with Zoe to coordinate clearance for upcoming surgery.  Pt verbalized understanding.  "

## 2019-02-08 NOTE — PRE-PROCEDURE INSTRUCTIONS
Shila Lopez  [phone]    Allergies, medical, surgical, family and psychosocial histories reviewed with patient. Periop plan of care reviewed. Preop instructions given, including medications to take and to hold. Hibiclens soap and instructions on use given. Time allotted for questions to be addressed.  Patient verbalized understanding.

## 2019-02-11 ENCOUNTER — HOSPITAL ENCOUNTER (OUTPATIENT)
Facility: HOSPITAL | Age: 49
Discharge: HOME | End: 2019-02-11
Attending: PODIATRIST | Admitting: PODIATRIST
Payer: COMMERCIAL

## 2019-02-11 ENCOUNTER — ANESTHESIA (OUTPATIENT)
Dept: SURGERY | Facility: HOSPITAL | Age: 49
End: 2019-02-11
Payer: COMMERCIAL

## 2019-02-11 VITALS
BODY MASS INDEX: 32.47 KG/M2 | DIASTOLIC BLOOD PRESSURE: 103 MMHG | RESPIRATION RATE: 16 BRPM | SYSTOLIC BLOOD PRESSURE: 168 MMHG | TEMPERATURE: 98 F | OXYGEN SATURATION: 100 % | WEIGHT: 245 LBS | HEIGHT: 73 IN | HEART RATE: 56 BPM

## 2019-02-11 DIAGNOSIS — M86.171 OSTEOMYELITIS OF FOOT, RIGHT, ACUTE: Primary | ICD-10-CM

## 2019-02-11 PROCEDURE — 37000009 HC ANESTHESIA EA ADD 15 MINS: Performed by: PODIATRIST

## 2019-02-11 PROCEDURE — 88305 TISSUE EXAM BY PATHOLOGIST: CPT | Performed by: PATHOLOGY

## 2019-02-11 PROCEDURE — S0020 INJECTION, BUPIVICAINE HYDRO: HCPCS | Performed by: PODIATRIST

## 2019-02-11 PROCEDURE — 88307 TISSUE EXAM BY PATHOLOGIST: CPT | Performed by: PATHOLOGY

## 2019-02-11 PROCEDURE — 88305 TISSUE EXAM BY PATHOLOGIST: CPT | Mod: 26,,, | Performed by: PATHOLOGY

## 2019-02-11 PROCEDURE — 71000015 HC POSTOP RECOV 1ST HR: Performed by: PODIATRIST

## 2019-02-11 PROCEDURE — 37000008 HC ANESTHESIA 1ST 15 MINUTES: Performed by: PODIATRIST

## 2019-02-11 PROCEDURE — 88305 TISSUE SPECIMEN TO PATHOLOGY - SURGERY: ICD-10-PCS | Mod: 26,,, | Performed by: PATHOLOGY

## 2019-02-11 PROCEDURE — 87176 TISSUE HOMOGENIZATION CULTR: CPT

## 2019-02-11 PROCEDURE — 88307 TISSUE EXAM BY PATHOLOGIST: CPT | Mod: 26,,, | Performed by: PATHOLOGY

## 2019-02-11 PROCEDURE — 36000707: Performed by: PODIATRIST

## 2019-02-11 PROCEDURE — 87075 CULTR BACTERIA EXCEPT BLOOD: CPT

## 2019-02-11 PROCEDURE — 87116 MYCOBACTERIA CULTURE: CPT

## 2019-02-11 PROCEDURE — 36000706: Performed by: PODIATRIST

## 2019-02-11 PROCEDURE — 88311 TISSUE SPECIMEN TO PATHOLOGY - SURGERY: ICD-10-PCS | Mod: 26,,, | Performed by: PATHOLOGY

## 2019-02-11 PROCEDURE — 25000003 PHARM REV CODE 250: Performed by: PODIATRIST

## 2019-02-11 PROCEDURE — 63600175 PHARM REV CODE 636 W HCPCS: Performed by: NURSE ANESTHETIST, CERTIFIED REGISTERED

## 2019-02-11 PROCEDURE — 88307 TISSUE SPECIMEN TO PATHOLOGY - SURGERY: ICD-10-PCS | Mod: 26,,, | Performed by: PATHOLOGY

## 2019-02-11 PROCEDURE — 88311 DECALCIFY TISSUE: CPT | Mod: 26,,, | Performed by: PATHOLOGY

## 2019-02-11 PROCEDURE — 87070 CULTURE OTHR SPECIMN AEROBIC: CPT

## 2019-02-11 PROCEDURE — 71000016 HC POSTOP RECOV ADDL HR: Performed by: PODIATRIST

## 2019-02-11 PROCEDURE — 25000003 PHARM REV CODE 250: Performed by: NURSE PRACTITIONER

## 2019-02-11 PROCEDURE — 87206 SMEAR FLUORESCENT/ACID STAI: CPT

## 2019-02-11 RX ORDER — BACITRACIN 50000 [IU]/1
INJECTION, POWDER, FOR SOLUTION INTRAMUSCULAR
Status: DISCONTINUED | OUTPATIENT
Start: 2019-02-11 | End: 2019-02-11 | Stop reason: HOSPADM

## 2019-02-11 RX ORDER — LIDOCAINE HYDROCHLORIDE 10 MG/ML
1 INJECTION, SOLUTION EPIDURAL; INFILTRATION; INTRACAUDAL; PERINEURAL ONCE
Status: DISCONTINUED | OUTPATIENT
Start: 2019-02-11 | End: 2019-02-11 | Stop reason: HOSPADM

## 2019-02-11 RX ORDER — HYDROMORPHONE HYDROCHLORIDE 2 MG/ML
0.5 INJECTION, SOLUTION INTRAMUSCULAR; INTRAVENOUS; SUBCUTANEOUS EVERY 5 MIN PRN
Status: CANCELLED | OUTPATIENT
Start: 2019-02-11

## 2019-02-11 RX ORDER — HYDROMORPHONE HYDROCHLORIDE 2 MG/ML
0.2 INJECTION, SOLUTION INTRAMUSCULAR; INTRAVENOUS; SUBCUTANEOUS EVERY 5 MIN PRN
Status: CANCELLED | OUTPATIENT
Start: 2019-02-11

## 2019-02-11 RX ORDER — SODIUM CHLORIDE, SODIUM LACTATE, POTASSIUM CHLORIDE, CALCIUM CHLORIDE 600; 310; 30; 20 MG/100ML; MG/100ML; MG/100ML; MG/100ML
INJECTION, SOLUTION INTRAVENOUS CONTINUOUS
Status: DISCONTINUED | OUTPATIENT
Start: 2019-02-11 | End: 2019-02-11 | Stop reason: HOSPADM

## 2019-02-11 RX ORDER — OXYCODONE AND ACETAMINOPHEN 5; 325 MG/1; MG/1
1 TABLET ORAL
Status: CANCELLED | OUTPATIENT
Start: 2019-02-11

## 2019-02-11 RX ORDER — LIDOCAINE HYDROCHLORIDE 10 MG/ML
INJECTION, SOLUTION EPIDURAL; INFILTRATION; INTRACAUDAL; PERINEURAL
Status: DISCONTINUED | OUTPATIENT
Start: 2019-02-11 | End: 2019-02-11 | Stop reason: HOSPADM

## 2019-02-11 RX ORDER — ONDANSETRON 8 MG/1
8 TABLET, ORALLY DISINTEGRATING ORAL EVERY 8 HOURS PRN
Status: DISCONTINUED | OUTPATIENT
Start: 2019-02-11 | End: 2019-02-11 | Stop reason: HOSPADM

## 2019-02-11 RX ORDER — LIDOCAINE HCL/PF 100 MG/5ML
SYRINGE (ML) INTRAVENOUS
Status: DISCONTINUED | OUTPATIENT
Start: 2019-02-11 | End: 2019-02-11

## 2019-02-11 RX ORDER — PROPOFOL 10 MG/ML
VIAL (ML) INTRAVENOUS
Status: DISCONTINUED | OUTPATIENT
Start: 2019-02-11 | End: 2019-02-11

## 2019-02-11 RX ORDER — CEFAZOLIN SODIUM 1 G/3ML
INJECTION, POWDER, FOR SOLUTION INTRAMUSCULAR; INTRAVENOUS
Status: DISCONTINUED | OUTPATIENT
Start: 2019-02-11 | End: 2019-02-11

## 2019-02-11 RX ORDER — BUPIVACAINE HYDROCHLORIDE 5 MG/ML
INJECTION, SOLUTION EPIDURAL; INTRACAUDAL
Status: DISCONTINUED | OUTPATIENT
Start: 2019-02-11 | End: 2019-02-11 | Stop reason: HOSPADM

## 2019-02-11 RX ORDER — HYDROCODONE BITARTRATE AND ACETAMINOPHEN 5; 325 MG/1; MG/1
1 TABLET ORAL EVERY 4 HOURS PRN
Status: DISCONTINUED | OUTPATIENT
Start: 2019-02-11 | End: 2019-02-11 | Stop reason: HOSPADM

## 2019-02-11 RX ORDER — FENTANYL CITRATE 50 UG/ML
INJECTION, SOLUTION INTRAMUSCULAR; INTRAVENOUS
Status: DISCONTINUED | OUTPATIENT
Start: 2019-02-11 | End: 2019-02-11

## 2019-02-11 RX ORDER — PROPOFOL 10 MG/ML
VIAL (ML) INTRAVENOUS CONTINUOUS PRN
Status: DISCONTINUED | OUTPATIENT
Start: 2019-02-11 | End: 2019-02-11

## 2019-02-11 RX ADMIN — FENTANYL CITRATE 50 MCG: 50 INJECTION, SOLUTION INTRAMUSCULAR; INTRAVENOUS at 07:02

## 2019-02-11 RX ADMIN — PROPOFOL 80 MG: 10 INJECTION, EMULSION INTRAVENOUS at 07:02

## 2019-02-11 RX ADMIN — CEFAZOLIN 2 G: 330 INJECTION, POWDER, FOR SOLUTION INTRAMUSCULAR; INTRAVENOUS at 07:02

## 2019-02-11 RX ADMIN — LIDOCAINE HYDROCHLORIDE 60 MG: 20 INJECTION, SOLUTION INTRAVENOUS at 07:02

## 2019-02-11 RX ADMIN — SODIUM CHLORIDE, SODIUM LACTATE, POTASSIUM CHLORIDE, AND CALCIUM CHLORIDE: .6; .31; .03; .02 INJECTION, SOLUTION INTRAVENOUS at 07:02

## 2019-02-11 RX ADMIN — PROPOFOL 50 MCG/KG/MIN: 10 INJECTION, EMULSION INTRAVENOUS at 07:02

## 2019-02-11 NOTE — ANESTHESIA POSTPROCEDURE EVALUATION
"Anesthesia Post Evaluation    Patient: Gonzalo Bustamante    Procedure(s) Performed: Procedure(s) (LRB):  I&D W/BONE DEBRIDEMENT - 3RD TOE (Right)  BONE BIOPSY (Right)  AMPUTATION, TOE (Right)    Final Anesthesia Type: MAC  Patient location during evaluation: OPS  Patient participation: Yes- Able to Participate  Level of consciousness: awake and alert and oriented  Post-procedure vital signs: reviewed and stable  Pain management: adequate  Airway patency: patent  PONV status at discharge: No PONV  Anesthetic complications: no      Cardiovascular status: hemodynamically stable  Respiratory status: unassisted, spontaneous ventilation and room air  Hydration status: euvolemic  Follow-up not needed.        Visit Vitals  BP (!) 142/100 (BP Location: Left arm, Patient Position: Lying)   Pulse 65   Temp 36.6 °C (97.9 °F) (Skin)   Resp 18   Ht 6' 1" (1.854 m)   Wt 111.1 kg (245 lb)   SpO2 96%   BMI 32.32 kg/m²       Pain/Francois Score: No Data Recorded      "

## 2019-02-11 NOTE — TRANSFER OF CARE
"Anesthesia Transfer of Care Note    Patient: Gonzalo Bustamante    Procedure(s) Performed: Procedure(s) (LRB):  I&D W/BONE DEBRIDEMENT - 3RD TOE (Right)  BONE BIOPSY (Right)  AMPUTATION, TOE (Right)    Patient location: OPS    Anesthesia Type: MAC    Transport from OR: Transported from OR on room air with adequate spontaneous ventilation    Post pain: adequate analgesia    Post assessment: no apparent anesthetic complications and tolerated procedure well    Post vital signs: stable    Level of consciousness: awake, alert and oriented    Nausea/Vomiting: no nausea/vomiting    Complications: none    Transfer of care protocol was followed      Last vitals:   Visit Vitals  BP (!) 142/100 (BP Location: Left arm, Patient Position: Lying)   Pulse 65   Temp 36.6 °C (97.9 °F) (Skin)   Resp 18   Ht 6' 1" (1.854 m)   Wt 111.1 kg (245 lb)   SpO2 96%   BMI 32.32 kg/m²     "

## 2019-02-11 NOTE — BRIEF OP NOTE
[facility]  Brief Operative Note     SUMMARY     Surgery Date: 2/11/2019     Surgeon(s) and Role:     * Madi Cifuentes DPM - Primary    Assisting Surgeon: None    Pre-op Diagnosis:  Osteomyelitis of ankle and foot [M86.9]      Post-op Diagnosis:  Post-Op Diagnosis Codes:     * Osteomyelitis of ankle and foot [M86.9]    Procedure(s) (LRB):  I&D W/BONE DEBRIDEMENT - 3RD TOE (Right)  BONE BIOPSY (Right)  AMPUTATION, TOE (Right)    Anesthesia: General/MAC with local    Description of the findings of the procedure: Osteomyelitis if the right 3rd digit middle phalanx     Findings/Key Components: Osteolysis of the right 3rd digit middle phalanx.    Estimated Blood Loss: * No values recorded between 2/11/2019  7:47 AM and 2/11/2019  8:28 AM *         Specimens:   Specimen (12h ago, onward)    Start     Ordered    02/11/19 0757  Specimen to Pathology - Surgery  Once     Comments:  Pre-op Diagnosis: Osteomyelitis of ankle and foot [M86.9]Varicose ulcer [I83.009, L97.909]Post-op Diagnosis: SAMEProcedure(s):I&D W/BONE DEBRIDEMENT - 3RD TOEINSERTION-IMPLANT ANTIBIOTIC PLACEMENTBONE BIOPSY Number of specimens: 3Name of specimens: 1. RIGHT 3RD TOE2. RIGHT 3RD TOE CLEAN MARGIN FOR BONE BIOPSY3. RIGHT 3RD TOE BONE BIOPSY FOR OSTEOMYELITIS     Start Status   02/11/19 0757 Collected (02/11/19 0825)       02/11/19 0824          Discharge Note    SUMMARY     Admit Date: 2/11/2019    Discharge Date and Time:  02/11/2019 8:30 AM    Hospital Course (synopsis of major diagnoses, care, treatment, and services provided during the course of the hospital stay): Patient was placed in outpatient surgery for right 3rd digit partial amputation due to osteomyelitis. Patient underwent procedure, tolerated procedure well. Patient was discharged with vital signs stable and neurovascular status intact.     Final Diagnosis: Post-Op Diagnosis Codes:     * Osteomyelitis of ankle and foot [M86.9]     * Varicose ulcer [I83.009,  L97.662]    Disposition: Home or Self Care    Follow Up/Patient Instructions:     Medications:  Reconciled Home Medications:      Medication List      ASK your doctor about these medications    AUGMENTIN 875-125 mg per tablet  Generic drug:  amoxicillin-clavulanate 875-125mg  Take 1 tablet by mouth every 12 (twelve) hours.     ciprofloxacin HCl 500 MG tablet  Commonly known as:  CIPRO  Take 500 mg by mouth every 12 (twelve) hours.     levothyroxine 200 MCG tablet  Commonly known as:  SYNTHROID  TAKE 1 TABLET BY MOUTH EVERY DAY     tadalafil 20 MG Tab  Commonly known as:  CIALIS  Take 1 tablet (20 mg total) by mouth daily as needed.          No discharge procedures on file.

## 2019-02-11 NOTE — PLAN OF CARE
PT from OR transported with stretcher, pt aaox3, vss, wife at bedside. Pt denies pain. Pt surgical wound cdi. Post op instruction reviewed with pt and pt's family. Pt and pt's family verbalize understanding. Safety precautions maintained. Call bell in reach.  Bed locked and in lowest position. Instructed pt to call for assistance. Pt verbalizes understanding.

## 2019-02-11 NOTE — OP NOTE
DATE OF PROCEDURE:  02/13/2019.    SURGEON:  Madi Cifuentes DPM.    ASSISTANT: none    PREOPERATIVE DIAGNOSIS:  Right third digit osteomyelitis.    POSTOPERATIVE DIAGNOSIS:  Right third digit osteomyelitis.    PROCEDURES PERFORMED:  Right third digit amputation with a fillet skin toe flap.    ANESTHESIA:  Monitored anesthesia care with a local block consisting of 1:1 mix   of lidocaine 1% plain/0.5% Marcaine plain.    HEMOSTASIS:  Pneumatic ankle tourniquet inflated to 250 mmHg x12 minutes.    ESTIMATED BLOOD LOSS:  Less than 5 mL.    MATERIALS:  3-0 Vicryl, 4-0 Prolene, 2-0 Vicryl, sterile dressing.    PATHOLOGY:  Bone culture and biopsy.    PROCEDURE IN DETAIL:  The patient was identified in the preoperative holding, at   which time the correct operative limb was signed.  The consent was reviewed in   detail.  Anesthesia met the patient to discussed administration of monitored   anesthesia care.  The patient was then transferred to the Operating Room, placed   on the operating room table in supine position with all bony prominences well   padded.  A time-out was then taken to identify the correct patient and correct   limb and the correct operative procedure to be performed.  All those in the room   did agree.  Pneumatic ankle tourniquet was then applied around the right ankle   preset at 250 mmHg.  Anesthesia administered monitored anesthesia care.  A local   block was then obtained about a right third digit.  The right foot was then   prepped and draped in a normal sterile surgical fashion.  An Esmarch was then   utilized to exsanguinate the right foot and the pneumatic ankle tourniquet was   then inflated to 250 mmHg.  Next, using a #15 blade, a racquet-type incision was   then performed along the dorsal aspect of the right third digit.  The incision   was then deepened through subcutaneous tissue and down to the level of bone.    All bleeders were then ligated.    The distal aspect of the right third digit was  then disarticulated at the level   of the proximal interphalangeal joint.  A #15 blade was then utilized to reflect   the periosteum from the distal aspect of the proximal phalanx.  A sagittal saw   was then utilized to resect the proximal phalanx.  Bone cultures and bone biopsy   was then taken from the distal aspect of the digit that was previously removed.    Copious irrigation was then performed.    A rongeur was then utilized to obtain a clean margin.  That was then sent for a   bone biopsy.    The pneumatic ankle tourniquet was then deflated.  Adequate hemostasis was then   achieved.    The deep layers were then reapproximated with 2-0 Vicryl.  A fillet toe flap was   then utilized to reapproximate the subcutaneous tissue with 3-0 Vicryl,   followed by reapproximation of the skin with 4-0 Prolene.  Betadine-soaked   Adaptic was then applied.  A sterile dressing was then applied to the surgical   foot.  The patient was then awoken from anesthesia without complication.  The   patient was transferred to the PACU with vital signs stable and neurovascular   status intact.      FERNANDO/IN  dd: 02/13/2019 14:06:45 (CST)  td: 02/13/2019 14:54:16 (CST)  Doc ID   #[id number]  Job ID #[id number]    CC:

## 2019-02-11 NOTE — ANESTHESIA POSTPROCEDURE EVALUATION
"Anesthesia Post Evaluation    Patient: Gonzalo Bustamante    Procedure(s) Performed: Procedure(s) (LRB):  I&D W/BONE DEBRIDEMENT - 3RD TOE (Right)  BONE BIOPSY (Right)  AMPUTATION, TOE (Right)    Final Anesthesia Type: MAC  [facility]  Patient participation: Yes- Able to Participate  Level of consciousness: awake and alert  Post-procedure vital signs: reviewed and stable  Pain management: adequate  Airway patency: patent  PONV status at discharge: No PONV  Anesthetic complications: no      Cardiovascular status: blood pressure returned to baseline  Respiratory status: unassisted  Hydration status: euvolemic  Follow-up not needed.        Visit Vitals  BP (!) 142/100 (BP Location: Left arm, Patient Position: Lying)   Pulse 65   Temp 36.6 °C (97.9 °F) (Skin)   Resp 18   Ht 6' 1" (1.854 m)   Wt 111.1 kg (245 lb)   SpO2 96%   BMI 32.32 kg/m²       Pain/Francois Score: No Data Recorded      "

## 2019-02-11 NOTE — PLAN OF CARE
Dr. Cifuentes at bedside speaking with pt and family. Bedside xray done. Pt and md stated pt received prescriptions at preop md appt. Will continue to monitor.

## 2019-02-11 NOTE — DISCHARGE INSTRUCTIONS
ANESTHESIA  -For the first 24 hours after surgery:  Do not drive, use heavy equipment, make important decisions, or drink alcohol  -It is normal to feel sleepy for several hours.  Rest until you are more awake.  -Have someone stay with you, if needed.  They can watch for problems and help keep you safe.  -Some possible post anesthesia side effects include: nausea and vomiting, sore throat and hoarseness, sleepiness, and dizziness.    PAIN  -If you have pain after surgery, pain medicine will help you feel better.  Take it as directed, before pain becomes severe.  Most pain relievers taken by mouth need at least 20-30 minutes to start working.  -Do not drive or drink alcohol while taking pain medicine.  -Pain medication can upset your stomach.  Taking them with a little food may help.  -Other ways to help control pain: elevation, ice, and relaxation  -Call your surgeon if still having unmanageable pain an hour after taking pain medicine.  -Pain medicine can cause constipation.  Taking an over-the counter stool softener while on prescription pain medicine and drinking plenty of fluids can prevent this side effect.  -Call your surgeon if you have severe side effects like: breathing problems, trouble waking up, dizziness, confusion, or severe constipation.    NAUSEA  -Some people have nausea after surgery.  This is often because of anesthesia, pain, pain medicine, or the stress of surgery.  -Do not push yourself to eat.  Start off with clear liquids and soup.  Slowly move to solid foods.  Don't eat fatty, rich, spicy foods at first.  Eat smaller amounts.  -If you develop persistent nausea and vomiting please notify your surgeon immediately.    BLEEDING  -Different types of surgery require different types of care and dressing changes.  It is important to follow all instructions and advice from your surgeon.  Change dressing as directed.  Call your surgeon for any concerns regarding postop bleeding.    SIGNS OF  INFECTION  -Signs of infection include: fever, swelling, drainage, and redness  -Notify your surgeon if you have a fever of 100.4 F (38.0 C) or higher.  -Notify your surgeon if you notice redness, swelling, increased pain, pus, or a foul smell at the incision site.        BOOT        Aircast Sp-Walker® Boot   Traditional splints and casts for the foot and ankle protect the injury by preventing movement at the joints. However, many injuries heal better and faster if the injured joint can be moved, but be protected at the same time. This is the reason for using an Aircast Walker boot.  This is a short boot that supports and protects to the foot and ankle while allowing you to walk. It has padded air cells that provide compression and help circulation. It is used for both foot and ankle injuries--both sprains and minor fractures.  Ankle and foot sprains can take 4 to 6 weeks or longer to heal. People with severe injuries or those over age 60 may need more time to heal. During that time, you are prone to re-injury by suddenly twisting your foot or ankle again while the ligaments are still weak.  When treating a sprain, the Aircast Walker boot should be worn whenever walking for at least 4 weeks, or as long as you continue to have ankle pain.  Talk to your healthcare provider for specific advice about the treatment of your condition.  Air-Stirrup® and SP-Walker® are trademarks of [employer].  For more information about their products, see www.aircast.com.  Date Last Reviewed: 5/1/2016 © 2000-2017 [employer]. [address] [place] [address]. All rights reserved. This information is not intended as a substitute for professional medical care. Always follow your healthcare professional's instructions.                            Phantom Sensation and Phantom Pain After Amputation  Most people who have an amputation will have some degree of phantom sensation. This is when you feel the missing part  of your limb. You may feel an itch or a tickle. Or it may feel as if the missing portion of your leg is asleep. It is most often mild, not painful. But sometimes, you may have stronger, painful sensations that seem to come from the missing part of your limb. It may feel like a quick zing or flash up your limb. Or, it may feel more like burning, twisting, cramping, or aching. When this happens, its called phantom pain. Persistent phantom pain is far less likely to happen than phantom sensation. And there are things you can do to feel better.  What causes phantom sensation?  No one is sure why phantom sensation happens. One common theory has to do with nerves. Nerves that supplied sensation to the missing portion of your limb are often still functioning. They are just higher up in your limb. This means that your brain may not know how to interpret signals from these nerves. Your brain may think that the signals are coming from the missing part of your limb.  Phantom limb is a diagnosis of exclusion meaning other possible causes must be ruled out. These include poor blood flow, infection, nerve tumor, and pressure wounds. Certain things may trigger phantom pain such as smoking, chest pain, cold temperatures, or certain physical actions.  Phantom pain  Phantom pain is likely to come and go. It may happen more often at night. Keep in mind that you wont necessarily have phantom pain after your amputation. Its far less common than phantom sensation. Phantom pain is most often manageable. But if it becomes hard to live with, talk to your healthcare provider.     Once sutures or staples come out, spend time every day gently rubbing and tapping your residual limb. This will help desensitize it.      Phantom pain often improves over time.  Treating phantom pain  There are several ways to treat phantom pain. If needed, ask your healthcare provider if any of these options might work for you. The ones listed below are the most  common treatments. Your healthcare provider may recommend others.  · Medicine. Antiseizure medicine, antidepressants, or other nerve medicines are often used to treat phantom pain. They may work better for this type of pain than normal painkillers.  · Frequent touch. Massaging, rubbing, and tapping the end of the residual limb helps with desensitization. This can lessen or relieve pain. You may begin massaging your residual limb once the surgical sutures or staples are removed.  · Acupuncture and biofeedback  · TENS unit (transcutaneous electrical nerve stimulation)  · Wearing a  sock. Keeping constant pressure on the end of the limb may help to relieve phantom pain.  · Using your prosthesis. Like wearing a  sock, using a prosthesis provides steady pressure to the limb. This seems to reduce phantom pain for some people.  Date Last Reviewed: 10/25/2015  © 0354-0012 [employer]. [address] [place] [address]. All rights reserved. This information is not intended as a substitute for professional medical care. Always follow your healthcare professional's instructions.                Managing Pain After Amputation Surgery  No matter what kind of surgery you have, pain is always a concern. As with any surgery, pain after amputation can be controlled. This can help you stay more comfortable. People react to pain in different ways. So learn how to describe your pain to your healthcare team. This means explaining where the pain is, how it feels, and how bad it is. This lets the healthcare team know how best to treat your pain.    Types of residual limb pain  Pain in your residual limb can be coming from different places. The following are the most common sources of limb pain after amputation:  · Skin can be very sensitive after amputation. Pain from your skin can feel sharp or irritating.  · Nerve pain can range from tingling to feeling like an electric shock. The source of nerve  pain may be a neuroma. A neuroma results when the ends of cut nerves grow into a painful ball under the skin.  · Muscle pain can feel like aching and cramping.  · Bone pain can occur if the end of the bone presses against the socket of your prosthesis. This may cause deep or sharp pain.  · Phantom pain is a pain felt in the missing limb after amputation and is a real pain thought to originate in the brain.  Explaining your pain  Only you know how your pain feels. After surgery, your goal is to get better. Pain relief plays a big part in your recovery. Be honest when a doctor or nurse asks about your pain. On a scale of 0 to 10 (if 0 means no pain, and 10 is the worst pain), how severe is the pain? Also mention the type of pain. Is it aching, burning, sharp, twisting, dull, or does it feel like an electric shock? Be sure to say how often the pain is happening.  Treating pain  Your doctor may need to try different medications or dosages. This can help find the most effective way to treat your pain. The most common pain medications used after surgery are opioids (narcotics). Opioids block pain signals on their way to the brain. This means they can control even severe pain. Nonsteroidal anti-inflammatory drugs (NSAIDs) may also be used. Like opioids, NSAIDs block pain signals on their way to the brain. Your doctor may also try antidepressants or anticonvulsant medications. They are commonly used to treat depression and seizure. But they have proven effective at relieving pain related to amputation. Always ask your doctor about possible side effects of the medicines you may be prescribed which may include drowsiness, constipation, and dependency. There are other things your doctor may recommend if medications do not help control your pain. Here are some common examples:  · Acupuncture  · TENS (transcutaneous electrical nerve stimulation)  · Biofeedback  · Massage therapy  · Hypnosis  · Meditation  The pain is telling you  something!  Pain is your bodys way of pointing out a problem. So dont try to tough it out. If your pain is not lessening after treatment, say so. Dont act brave or worry about being a pest. Medications and other treatments can be adjusted to meet your needs. Remember that the goal of amputation is to help restore function. Pain can be a barrier to your recovery. Finding what works for you is what really matters. Work with your amputation team to resolve pain issues as they occur during your recovery.   Date Last Reviewed: 11/15/2015  © 9501-8293 [employer]. [address] [place] [address]. All rights reserved. This information is not intended as a substitute for professional medical care. Always follow your healthcare professional's instructions.

## 2019-02-11 NOTE — PLAN OF CARE
Dr. Santoro with anesthesia notified of pt b/p of 164/107 and 168/103. Md stated pt ok to dc. Pt and wife informed that pt should follow up with pcp for hypertension, both verbalized understanding.

## 2019-02-11 NOTE — PLAN OF CARE
Given discharged instructions, reviewed with pt and family, questions encouraged, understanding verbalized. Pt vss for pt this visit, elevated b/p discussed with anesthesia md and family.Surgical site cdi,boot placed to surgical foot, iv discontinued, iv cath intact, guaze with pressure and dressing applied. Pt tolerated fluids well. Pt dressed and ready to go home.

## 2019-02-11 NOTE — PLAN OF CARE
Pt up to restroom with nurse and wife, pt tolerating fluids well and able to void. Will continue to monitor.

## 2019-02-12 RX ORDER — LEVOTHYROXINE SODIUM 200 UG/1
TABLET ORAL
Qty: 30 TABLET | Refills: 0 | Status: SHIPPED | OUTPATIENT
Start: 2019-02-12 | End: 2019-03-10 | Stop reason: SDUPTHER

## 2019-02-15 LAB — BACTERIA SPEC AEROBE CULT: NO GROWTH

## 2019-02-18 LAB — BACTERIA SPEC ANAEROBE CULT: NORMAL

## 2019-02-20 NOTE — PROGRESS NOTES
Patient is a 48 year old male who was admitted to outpatient surgery for right 3rd digit osteomyelitis secondary to a non healing ulceration. The patient was taken to the OR for a partial amputation of the right 3rd digit with flap closure. The patient tolerated the procedure and anesthesia well and was discharged. He will follow up in clinic with dr Cifuentes.

## 2019-03-10 DIAGNOSIS — E03.9 HYPOTHYROIDISM, UNSPECIFIED TYPE: ICD-10-CM

## 2019-03-12 RX ORDER — LEVOTHYROXINE SODIUM 200 UG/1
TABLET ORAL
Qty: 30 TABLET | Refills: 12 | Status: SHIPPED | OUTPATIENT
Start: 2019-03-12 | End: 2020-02-05 | Stop reason: SDUPTHER

## 2019-04-15 LAB
ACID FAST MOD KINY STN SPEC: NORMAL
MYCOBACTERIUM SPEC QL CULT: NORMAL

## 2019-08-26 RX ORDER — TADALAFIL 20 MG/1
TABLET ORAL
Qty: 10 TABLET | Refills: 0 | Status: SHIPPED | OUTPATIENT
Start: 2019-08-26 | End: 2019-09-12 | Stop reason: SDUPTHER

## 2019-09-12 RX ORDER — TADALAFIL 20 MG/1
TABLET ORAL
Qty: 10 TABLET | Refills: 0 | Status: SHIPPED | OUTPATIENT
Start: 2019-09-12 | End: 2019-09-13 | Stop reason: SDUPTHER

## 2019-09-13 RX ORDER — TADALAFIL 20 MG/1
TABLET ORAL
Qty: 10 TABLET | Refills: 0 | Status: SHIPPED | OUTPATIENT
Start: 2019-09-13 | End: 2019-11-07 | Stop reason: SDUPTHER

## 2019-11-08 RX ORDER — TADALAFIL 20 MG/1
TABLET ORAL
Qty: 10 TABLET | Refills: 0 | Status: SHIPPED | OUTPATIENT
Start: 2019-11-08 | End: 2019-11-18 | Stop reason: SDUPTHER

## 2019-11-19 RX ORDER — TADALAFIL 20 MG/1
TABLET ORAL
Qty: 10 TABLET | Refills: 0 | Status: SHIPPED | OUTPATIENT
Start: 2019-11-19 | End: 2019-12-07 | Stop reason: SDUPTHER

## 2019-12-09 RX ORDER — TADALAFIL 20 MG/1
TABLET ORAL
Qty: 10 TABLET | Refills: 0 | Status: SHIPPED | OUTPATIENT
Start: 2019-12-09 | End: 2020-01-03

## 2020-01-03 RX ORDER — TADALAFIL 20 MG/1
TABLET ORAL
Qty: 10 TABLET | Refills: 0 | Status: SHIPPED | OUTPATIENT
Start: 2020-01-03 | End: 2020-02-03

## 2020-02-03 RX ORDER — TADALAFIL 20 MG/1
TABLET ORAL
Qty: 10 TABLET | Refills: 0 | Status: SHIPPED | OUTPATIENT
Start: 2020-02-03 | End: 2020-07-27 | Stop reason: SDUPTHER

## 2020-02-03 RX ORDER — TADALAFIL 20 MG/1
TABLET ORAL
Qty: 10 TABLET | Refills: 0 | Status: SHIPPED | OUTPATIENT
Start: 2020-02-03 | End: 2020-02-28 | Stop reason: SDUPTHER

## 2020-02-05 ENCOUNTER — OFFICE VISIT (OUTPATIENT)
Dept: ENDOCRINOLOGY | Facility: CLINIC | Age: 50
End: 2020-02-05
Payer: COMMERCIAL

## 2020-02-05 VITALS
WEIGHT: 243.5 LBS | BODY MASS INDEX: 32.27 KG/M2 | HEIGHT: 73 IN | DIASTOLIC BLOOD PRESSURE: 82 MMHG | SYSTOLIC BLOOD PRESSURE: 150 MMHG

## 2020-02-05 DIAGNOSIS — E03.9 HYPOTHYROIDISM, UNSPECIFIED TYPE: ICD-10-CM

## 2020-02-05 DIAGNOSIS — E89.0 HYPOTHYROIDISM FOLLOWING RADIOIODINE THERAPY: Primary | ICD-10-CM

## 2020-02-05 PROCEDURE — 99213 PR OFFICE/OUTPT VISIT, EST, LEVL III, 20-29 MIN: ICD-10-PCS | Mod: S$GLB,,, | Performed by: STUDENT IN AN ORGANIZED HEALTH CARE EDUCATION/TRAINING PROGRAM

## 2020-02-05 PROCEDURE — 3008F PR BODY MASS INDEX (BMI) DOCUMENTED: ICD-10-PCS | Mod: CPTII,S$GLB,, | Performed by: STUDENT IN AN ORGANIZED HEALTH CARE EDUCATION/TRAINING PROGRAM

## 2020-02-05 PROCEDURE — 99999 PR PBB SHADOW E&M-EST. PATIENT-LVL III: CPT | Mod: PBBFAC,,, | Performed by: STUDENT IN AN ORGANIZED HEALTH CARE EDUCATION/TRAINING PROGRAM

## 2020-02-05 PROCEDURE — 99999 PR PBB SHADOW E&M-EST. PATIENT-LVL III: ICD-10-PCS | Mod: PBBFAC,,, | Performed by: STUDENT IN AN ORGANIZED HEALTH CARE EDUCATION/TRAINING PROGRAM

## 2020-02-05 PROCEDURE — 99213 OFFICE O/P EST LOW 20 MIN: CPT | Mod: S$GLB,,, | Performed by: STUDENT IN AN ORGANIZED HEALTH CARE EDUCATION/TRAINING PROGRAM

## 2020-02-05 PROCEDURE — 3008F BODY MASS INDEX DOCD: CPT | Mod: CPTII,S$GLB,, | Performed by: STUDENT IN AN ORGANIZED HEALTH CARE EDUCATION/TRAINING PROGRAM

## 2020-02-05 RX ORDER — LEVOTHYROXINE SODIUM 200 UG/1
200 TABLET ORAL DAILY
Qty: 30 TABLET | Refills: 0 | Status: SHIPPED | OUTPATIENT
Start: 2020-02-05 | End: 2020-02-27 | Stop reason: SDUPTHER

## 2020-02-05 RX ORDER — TADALAFIL 20 MG/1
20 TABLET ORAL DAILY PRN
Qty: 10 TABLET | Refills: 0 | OUTPATIENT
Start: 2020-02-05

## 2020-02-05 NOTE — PROGRESS NOTES
Subjective:      Patient ID: Gonzalo Bustamante is a 49 y.o. male.    Chief Complaint:  Follow-up      History of Present Illness  A 50 yo man with diagnosis of malignant melanoma (on the back) s/p resection and graves disease s/p NAGY ablation (age 19) is here for follow up.    Was on levothyroxine 200 mcg, qAM, every morning on an empty stomach.   Ran out of levothyroxine about 4 days ago.     Denies fatigue, palpitations, tremors, constipation.   No significant changes in weight.     FH: no h/o thyroid disorders.     SH: Smokes 1 pack cig per day. Consumes alcohol socially    Thyroid US: 12/2018- no thyroid nodules noted.      Review of Systems   Constitutional: Negative for unexpected weight change.   Eyes: Negative for visual disturbance.   Respiratory: Negative for shortness of breath.    Cardiovascular: Negative for chest pain.   Gastrointestinal: Negative for abdominal pain.   Genitourinary: Negative for urgency.   Musculoskeletal: Negative for arthralgias.   Skin: Negative for wound.   Neurological: Negative for headaches.   Hematological: Does not bruise/bleed easily.   Psychiatric/Behavioral: Negative for sleep disturbance.       Objective:   Physical Exam   Constitutional: He is oriented to person, place, and time. He appears well-developed and well-nourished.   HENT:   Head: Normocephalic and atraumatic.   Neck: Neck supple. No thyromegaly present.   Cardiovascular: Normal rate, regular rhythm and normal heart sounds.   Pulmonary/Chest: Effort normal. No respiratory distress.   Abdominal: Soft. There is no tenderness.   Neurological: He is alert and oriented to person, place, and time.   Skin: Skin is warm. No rash noted.   Psychiatric: He has a normal mood and affect. His behavior is normal.       Wt Readings from Last 1 Encounters:   02/05/20 1421 110.5 kg (243 lb 8 oz)     BP Readings from Last 3 Encounters:   02/05/20 (!) 150/82   02/11/19 (!) 168/103   02/08/19 125/89     BP (!) 150/82   Ht  "6' 1" (1.854 m)   Wt 110.5 kg (243 lb 8 oz)   BMI 32.13 kg/m²       Lab Review:   No results found for: HGBA1C  No results found for: CHOL, HDL, LDLCALC, TRIG, CHOLHDL  Lab Results   Component Value Date     01/17/2009    K 4.6 01/17/2009     01/17/2009    CO2 27 01/17/2009    GLU 97 01/17/2009    BUN 20 01/17/2009    CREATININE 1.2 01/17/2009    CALCIUM 10.4 01/17/2009    PROT 7.2 01/17/2009    ALBUMIN 5.1 01/17/2009    BILITOT 0.6 01/17/2009    ALKPHOS 54 (L) 01/17/2009    AST 14 01/17/2009    ALT 21 01/17/2009    TSH 5.695 (H) 11/07/2018         Assessment and Plan     Hypothyroidism following radioiodine therapy  Pt seems hyperactive during the clinic encounter, but no evidence of tachycardia or tremors.   Pt states he is going on vacation tomorrow morning and needs refills immediately.     -Check TSH & FT4  -Continue current dose of levothyroxine 200 mcg, qAM, for now. Will adjust the dose once we have results of thyroid function.      Discussed with Dr. Pratt    "

## 2020-02-05 NOTE — ASSESSMENT & PLAN NOTE
Pt seems hyperactive during the clinic encounter, but no evidence of tachycardia or tremors.   Pt states he is going on vacation tomorrow morning and needs refills immediately.     -Check TSH & FT4  -Continue current dose of levothyroxine 200 mcg, qAM, for now. Will adjust the dose once we have results of thyroid function.

## 2020-02-10 ENCOUNTER — LAB VISIT (OUTPATIENT)
Dept: LAB | Facility: OTHER | Age: 50
End: 2020-02-10
Attending: STUDENT IN AN ORGANIZED HEALTH CARE EDUCATION/TRAINING PROGRAM
Payer: COMMERCIAL

## 2020-02-10 DIAGNOSIS — E89.0 HYPOTHYROIDISM FOLLOWING RADIOIODINE THERAPY: ICD-10-CM

## 2020-02-10 LAB
T4 FREE SERPL-MCNC: 1.12 NG/DL (ref 0.71–1.51)
TSH SERPL DL<=0.005 MIU/L-ACNC: 4.73 UIU/ML (ref 0.4–4)

## 2020-02-10 PROCEDURE — 84443 ASSAY THYROID STIM HORMONE: CPT

## 2020-02-10 PROCEDURE — 84439 ASSAY OF FREE THYROXINE: CPT

## 2020-02-10 PROCEDURE — 36415 COLL VENOUS BLD VENIPUNCTURE: CPT

## 2020-02-11 ENCOUNTER — TELEPHONE (OUTPATIENT)
Dept: ENDOCRINOLOGY | Facility: CLINIC | Age: 50
End: 2020-02-11

## 2020-02-11 DIAGNOSIS — E89.0 HYPOTHYROIDISM FOLLOWING RADIOIODINE THERAPY: Primary | ICD-10-CM

## 2020-02-11 NOTE — TELEPHONE ENCOUNTER
----- Message from Manuela Quinteros MD sent at 2/11/2020  1:24 PM CST -----  Please schedule labs in 2 months.   Thanks

## 2020-02-26 RX ORDER — TADALAFIL 20 MG/1
TABLET ORAL
Qty: 10 TABLET | Refills: 0 | OUTPATIENT
Start: 2020-02-26

## 2020-02-27 DIAGNOSIS — E03.9 HYPOTHYROIDISM, UNSPECIFIED TYPE: ICD-10-CM

## 2020-02-28 RX ORDER — TADALAFIL 20 MG/1
20 TABLET ORAL DAILY PRN
Qty: 10 TABLET | Refills: 0 | Status: SHIPPED | OUTPATIENT
Start: 2020-02-28 | End: 2020-03-12

## 2020-02-28 RX ORDER — LEVOTHYROXINE SODIUM 200 UG/1
200 TABLET ORAL DAILY
Qty: 30 TABLET | Refills: 3 | Status: SHIPPED | OUTPATIENT
Start: 2020-02-28 | End: 2020-05-29 | Stop reason: SDUPTHER

## 2020-02-28 NOTE — TELEPHONE ENCOUNTER
----- Message from Marie Jasso sent at 2/28/2020  2:07 PM CST -----  Contact: Self      Can the clinic reply in [facility]:        Please refill the medication(s) listed below. Please call the patient when the prescription(s) is ready for  at this phone number         Medication #1 tadalafil (CIALIS) 20 MG Tab    Medication #2       Preferred Pharmacy: [id number] - [place] [address] AT [address] [place]

## 2020-03-12 RX ORDER — TADALAFIL 20 MG/1
TABLET ORAL
Qty: 10 TABLET | Refills: 0 | Status: SHIPPED | OUTPATIENT
Start: 2020-03-12 | End: 2020-04-02

## 2020-04-02 RX ORDER — TADALAFIL 20 MG/1
TABLET ORAL
Qty: 10 TABLET | Refills: 0 | Status: SHIPPED | OUTPATIENT
Start: 2020-04-02 | End: 2020-04-15

## 2020-04-15 RX ORDER — TADALAFIL 20 MG/1
TABLET ORAL
Qty: 10 TABLET | Refills: 0 | Status: SHIPPED | OUTPATIENT
Start: 2020-04-15 | End: 2020-04-25

## 2020-04-25 RX ORDER — TADALAFIL 20 MG/1
TABLET ORAL
Qty: 10 TABLET | Refills: 0 | Status: SHIPPED | OUTPATIENT
Start: 2020-04-25 | End: 2020-05-14 | Stop reason: SDUPTHER

## 2020-04-27 ENCOUNTER — OFFICE VISIT (OUTPATIENT)
Dept: URGENT CARE | Facility: CLINIC | Age: 50
End: 2020-04-27
Payer: COMMERCIAL

## 2020-04-27 VITALS — HEART RATE: 80 BPM | OXYGEN SATURATION: 98 % | TEMPERATURE: 97 F

## 2020-04-27 DIAGNOSIS — Z20.822 SUSPECTED COVID-19 VIRUS INFECTION: Primary | ICD-10-CM

## 2020-04-27 LAB — SARS-COV-2 RNA RESP QL NAA+PROBE: NOT DETECTED

## 2020-04-27 PROCEDURE — 99203 PR OFFICE/OUTPT VISIT, NEW, LEVL III, 30-44 MIN: ICD-10-PCS | Mod: S$GLB,,, | Performed by: NURSE PRACTITIONER

## 2020-04-27 PROCEDURE — U0002 COVID-19 LAB TEST NON-CDC: HCPCS

## 2020-04-27 PROCEDURE — 99203 OFFICE O/P NEW LOW 30 MIN: CPT | Mod: S$GLB,,, | Performed by: NURSE PRACTITIONER

## 2020-04-27 NOTE — PROGRESS NOTES
Subjective:       Patient ID: Gonzalo Bustamante is a 49 y.o. male.    Vitals:  tympanic temperature is 97 °F (36.1 °C). His pulse is 80. His oxygen saturation is 98%.     Chief Complaint: Fever    Patient presents with c.o sinus congestion, cough that started on Friday. On Saturday, patient notes fatigue and diarrhea. Temp max 100.0 F. Denies any known exp to covid. Patient has been working at a bank during a quarantine.     Fever    This is a new problem. Episode onset: 2 days  The problem occurs intermittently. The problem has been unchanged. The maximum temperature noted was 100 to 100.9 F. The temperature was taken using a tympanic thermometer. Associated symptoms include congestion, coughing, diarrhea, nausea and a sore throat. Pertinent negatives include no chest pain, headaches, rash, urinary pain or vomiting. He has tried NSAIDs for the symptoms. The treatment provided mild relief.       Constitution: Positive for chills, fatigue and fever.   HENT: Positive for congestion and sore throat.    Neck: Negative for painful lymph nodes.   Cardiovascular: Negative for chest pain and leg swelling.   Eyes: Negative for double vision and blurred vision.   Respiratory: Positive for cough and shortness of breath.    Gastrointestinal: Positive for nausea and diarrhea. Negative for vomiting.   Genitourinary: Negative for dysuria, frequency and urgency.   Musculoskeletal: Positive for muscle ache. Negative for joint pain, joint swelling and muscle cramps.   Skin: Negative for color change, pale and rash.   Allergic/Immunologic: Negative for seasonal allergies.   Neurological: Positive for light-headedness. Negative for dizziness, history of vertigo, passing out and headaches.   Hematologic/Lymphatic: Negative for swollen lymph nodes, easy bruising/bleeding and history of blood clots. Does not bruise/bleed easily.   Psychiatric/Behavioral: Negative for nervous/anxious, sleep disturbance and depression. The patient is  not nervous/anxious.        Objective:      Physical Exam    due to the state of emergency this visit has been done remotely as per [facility] protocol.   Assessment:       1. Suspected Covid-19 Virus Infection        Plan:         Suspected Covid-19 Virus Infection  -     COVID-19 Routine Screening      Patient Instructions   Use an antihistamine such as Claritin, Zyrtec or Allegra to dry you out.     Use Flonase 1-2 sprays/nostril per day. It is a local acting steroid nasal spray, if you develop a bloody nose, stop using the medication immediately.    Use gatorade/pedialyte/coconut water or rehydration packets to help stay hydrated.   Increase clear liquids (water, gatorade, pedialyte, broths, jello, etc). Advance to BRAT diet (banana, rice, applesauce, tea, toast/crackers), then advance further as tolerated.    Use Peptobismol to help alleviate your diarrhea symptoms. Avoid immodium.     Take tylenol for minor pain or fever.     Instructions for Patients Suspected COVID-19    Please continue infection control precautions like covering your mouth when coughing, washing hands frequently and minimizing contact with others whenever possible. Current CDC recommendations do not require quarantine if you are feeling OK and haven't had fever in 24 hours. However, we recommend that you stay home while you are awaiting test results, if possible. Consider wearing a mask if you need to go out in public, until result is known.      Instructions for Patients with Confirmed or Suspected COVID-19    If you are awaiting your test result, you will either be called or it will be released to the patient portal.  If you have any questions about your test, please visit [email or web address] or call our COVID-19 information line at [phone].       Stay home and stay away from family members and friends. The CDC says, you can leave home after these three things have happened: 1) You have had no fever for at least 72 hours  (that is three full days of no fever without the use of medicine that reduces fevers) 2) AND other symptoms have improved (for example, when your cough or shortness of breath have improved) 3) AND at least 7 days have passed since your symptoms first appeared.   Separate yourself from other people and animals in your home.   Call ahead before visiting your doctor.   Wear a facemask.   Cover your coughs and sneezes.   Wash your hands often with soap and water; hand  can be used, too.   Avoid sharing personal household items.   Wipe down surfaces used daily.   Monitor your symptoms. Seek prompt medical attention if your illness is worsening (e.g., difficulty breathing).    Before seeking care, call your healthcare provider.   If you have a medical emergency and need to call 911, notify the dispatch personnel that you have, or are being evaluated for COVID-19. If possible, put on a facemask before emergency medical services arrive.        Recommended precautions for household members, intimate partners, and caregivers in a home setting of a patient with symptomatic laboratory-confirmed COVID-19 or a patient under investigation.  Household members, intimate partners, and caregivers in the home setting awaiting tests results have close contact with a person with symptomatic, laboratory-confirmed COVID-19 or a person under investigation. Close contacts should monitor their health; they should call their provider right away if they develop symptoms suggestive of COVID-19 (e.g., fever, cough, shortness of breath).    Close contacts should also follow these recommendations:   Make sure that you understand and can help the patient follow their provider's instructions for medication(s) and care. You should help the patient with basic needs in the home and provide support for getting groceries, prescriptions, and other personal needs.   Monitor the patient's symptoms. If the patient is getting sicker, call  his or her healthcare provider and tell them that the patient has laboratory-confirmed COVID-19. If the patient has a medical emergency and you need to call 911, notify the dispatch personnel that the patient has, or is being evaluated for COVID-19.   Household members should stay in another room or be  from the patient. Household members should use a separate bedroom and bathroom, if available.   Prohibit visitors.   Household members should care for any pets in the home.   Make sure that shared spaces in the home have good air flow, such as by an air conditioner or an opened window, weather permitting.   Perform hand hygiene frequently. Wash your hands often with soap and water for at least 20 seconds or use an alcohol-based hand  (that contains > 60% alcohol) covering all surfaces of your hands and rubbing them together until they feel dry. Soap and water should be used preferentially.   Avoid touching your eyes, nose, and mouth.   The patient should wear a facemask. If the patient is not able to wear a facemask (for example, because it causes trouble breathing), caregivers should wear a mask when they are in the same room as the patient.   Wear a disposable facemask and gloves when you touch or have contact with the patient's blood, stool, or body fluids, such as saliva, sputum, nasal mucus, vomit, urine.  o Throw out disposable facemasks and gloves after using them. Do not reuse.  o When removing personal protective equipment, first remove and dispose of gloves. Then, immediately clean your hands with soap and water or alcohol-based hand . Next, remove and dispose of facemask, and immediately clean your hands again with soap and water or alcohol-based hand .   You should not share dishes, drinking glasses, cups, eating utensils, towels, bedding, or other items with the patient. After the patient uses these items, you should wash them thoroughly (see below Wash  laundry thoroughly).   Clean all high-touch surfaces, such as counters, tabletops, doorknobs, bathroom fixtures, toilets, phones, keyboards, tablets, and bedside tables, every day. Also, clean any surfaces that may have blood, stool, or body fluids on them.   Use a household cleaning spray or wipe, according to the label instructions. Labels contain instructions for safe and effective use of the cleaning product including precautions you should take when applying the product, such as wearing gloves and making sure you have good ventilation during use of the product.   Wash laundry thoroughly.  o Immediately remove and wash clothes or bedding that have blood, stool, or body fluids on them.  o Wear disposable gloves while handling soiled items and keep soiled items away from your body. Clean your hands (with soap and water or an alcohol-based hand ) immediately after removing your gloves.  o Read and follow directions on labels of laundry or clothing items and detergent. In general, using a normal laundry detergent according to washing machine instructions and dry thoroughly using the warmest temperatures recommended on the clothing label.   Place all used disposable gloves, facemasks, and other contaminated items in a lined container before disposing of them with other household waste. Clean your hands (with soap and water or an alcohol-based hand ) immediately after handling these items. Soap and water should be used preferentially if hands are visibly dirty.   Discuss any additional questions with your state or local health department or healthcare provider. Check available hours when contacting your local health department.    For more information see CDC link below.      https://www.cdc.gov/coronavirus/2019-ncov/hcp/guidance-prevent-spread.html#precautions        Sources:  [facility], [place] [facility] and [facility]

## 2020-04-27 NOTE — PATIENT INSTRUCTIONS
Use an antihistamine such as Claritin, Zyrtec or Allegra to dry you out.     Use Flonase 1-2 sprays/nostril per day. It is a local acting steroid nasal spray, if you develop a bloody nose, stop using the medication immediately.    Use gatorade/pedialyte/coconut water or rehydration packets to help stay hydrated.   Increase clear liquids (water, gatorade, pedialyte, broths, jello, etc). Advance to BRAT diet (banana, rice, applesauce, tea, toast/crackers), then advance further as tolerated.    Use Peptobismol to help alleviate your diarrhea symptoms. Avoid immodium.     Take tylenol for minor pain or fever.     Instructions for Patients Suspected COVID-19    Please continue infection control precautions like covering your mouth when coughing, washing hands frequently and minimizing contact with others whenever possible. Current CDC recommendations do not require quarantine if you are feeling OK and haven't had fever in 24 hours. However, we recommend that you stay home while you are awaiting test results, if possible. Consider wearing a mask if you need to go out in public, until result is known.      Instructions for Patients with Confirmed or Suspected COVID-19    If you are awaiting your test result, you will either be called or it will be released to the patient portal.  If you have any questions about your test, please visit [email or web address] or call our COVID-19 information line at [phone].       Stay home and stay away from family members and friends. The CDC says, you can leave home after these three things have happened: 1) You have had no fever for at least 72 hours (that is three full days of no fever without the use of medicine that reduces fevers) 2) AND other symptoms have improved (for example, when your cough or shortness of breath have improved) 3) AND at least 7 days have passed since your symptoms first appeared.   Separate yourself from other people and animals in your  home.   Call ahead before visiting your doctor.   Wear a facemask.   Cover your coughs and sneezes.   Wash your hands often with soap and water; hand  can be used, too.   Avoid sharing personal household items.   Wipe down surfaces used daily.   Monitor your symptoms. Seek prompt medical attention if your illness is worsening (e.g., difficulty breathing).    Before seeking care, call your healthcare provider.   If you have a medical emergency and need to call 911, notify the dispatch personnel that you have, or are being evaluated for COVID-19. If possible, put on a facemask before emergency medical services arrive.        Recommended precautions for household members, intimate partners, and caregivers in a home setting of a patient with symptomatic laboratory-confirmed COVID-19 or a patient under investigation.  Household members, intimate partners, and caregivers in the home setting awaiting tests results have close contact with a person with symptomatic, laboratory-confirmed COVID-19 or a person under investigation. Close contacts should monitor their health; they should call their provider right away if they develop symptoms suggestive of COVID-19 (e.g., fever, cough, shortness of breath).    Close contacts should also follow these recommendations:   Make sure that you understand and can help the patient follow their provider's instructions for medication(s) and care. You should help the patient with basic needs in the home and provide support for getting groceries, prescriptions, and other personal needs.   Monitor the patient's symptoms. If the patient is getting sicker, call his or her healthcare provider and tell them that the patient has laboratory-confirmed COVID-19. If the patient has a medical emergency and you need to call 911, notify the dispatch personnel that the patient has, or is being evaluated for COVID-19.   Household members should stay in another room or be  from  the patient. Household members should use a separate bedroom and bathroom, if available.   Prohibit visitors.   Household members should care for any pets in the home.   Make sure that shared spaces in the home have good air flow, such as by an air conditioner or an opened window, weather permitting.   Perform hand hygiene frequently. Wash your hands often with soap and water for at least 20 seconds or use an alcohol-based hand  (that contains > 60% alcohol) covering all surfaces of your hands and rubbing them together until they feel dry. Soap and water should be used preferentially.   Avoid touching your eyes, nose, and mouth.   The patient should wear a facemask. If the patient is not able to wear a facemask (for example, because it causes trouble breathing), caregivers should wear a mask when they are in the same room as the patient.   Wear a disposable facemask and gloves when you touch or have contact with the patient's blood, stool, or body fluids, such as saliva, sputum, nasal mucus, vomit, urine.  o Throw out disposable facemasks and gloves after using them. Do not reuse.  o When removing personal protective equipment, first remove and dispose of gloves. Then, immediately clean your hands with soap and water or alcohol-based hand . Next, remove and dispose of facemask, and immediately clean your hands again with soap and water or alcohol-based hand .   You should not share dishes, drinking glasses, cups, eating utensils, towels, bedding, or other items with the patient. After the patient uses these items, you should wash them thoroughly (see below Wash laundry thoroughly).   Clean all high-touch surfaces, such as counters, tabletops, doorknobs, bathroom fixtures, toilets, phones, keyboards, tablets, and bedside tables, every day. Also, clean any surfaces that may have blood, stool, or body fluids on them.   Use a household cleaning spray or wipe, according to the  label instructions. Labels contain instructions for safe and effective use of the cleaning product including precautions you should take when applying the product, such as wearing gloves and making sure you have good ventilation during use of the product.   Wash laundry thoroughly.  o Immediately remove and wash clothes or bedding that have blood, stool, or body fluids on them.  o Wear disposable gloves while handling soiled items and keep soiled items away from your body. Clean your hands (with soap and water or an alcohol-based hand ) immediately after removing your gloves.  o Read and follow directions on labels of laundry or clothing items and detergent. In general, using a normal laundry detergent according to washing machine instructions and dry thoroughly using the warmest temperatures recommended on the clothing label.   Place all used disposable gloves, facemasks, and other contaminated items in a lined container before disposing of them with other household waste. Clean your hands (with soap and water or an alcohol-based hand ) immediately after handling these items. Soap and water should be used preferentially if hands are visibly dirty.   Discuss any additional questions with your state or local health department or healthcare provider. Check available hours when contacting your local health department.    For more information see CDC link below.      https://www.cdc.gov/coronavirus/2019-ncov/hcp/guidance-prevent-spread.html#precautions        Sources:  [facility], [place] [facility] and [facility]

## 2020-04-28 ENCOUNTER — TELEPHONE (OUTPATIENT)
Dept: URGENT CARE | Facility: CLINIC | Age: 50
End: 2020-04-28

## 2020-05-14 RX ORDER — TADALAFIL 20 MG/1
TABLET ORAL
Qty: 10 TABLET | Refills: 0 | Status: SHIPPED | OUTPATIENT
Start: 2020-05-14 | End: 2020-05-27

## 2020-05-27 RX ORDER — TADALAFIL 20 MG/1
TABLET ORAL
Qty: 10 TABLET | Refills: 0 | Status: SHIPPED | OUTPATIENT
Start: 2020-05-27 | End: 2022-03-04 | Stop reason: ALTCHOICE

## 2020-05-29 DIAGNOSIS — E03.9 HYPOTHYROIDISM, UNSPECIFIED TYPE: ICD-10-CM

## 2020-06-01 ENCOUNTER — TELEPHONE (OUTPATIENT)
Dept: ENDOCRINOLOGY | Facility: CLINIC | Age: 50
End: 2020-06-01

## 2020-06-01 RX ORDER — LEVOTHYROXINE SODIUM 200 UG/1
200 TABLET ORAL DAILY
Qty: 30 TABLET | Refills: 3 | Status: SHIPPED | OUTPATIENT
Start: 2020-06-01 | End: 2020-08-26 | Stop reason: SDUPTHER

## 2020-06-01 NOTE — TELEPHONE ENCOUNTER
----- Message from Manuela Quinteros MD sent at 6/1/2020  8:52 AM CDT -----  Please schedule repeat thyroid function tests in the next 1-2 weeks. Pt was supposed to repeat them in April.    Thanks,  Manuela

## 2020-06-09 ENCOUNTER — TELEPHONE (OUTPATIENT)
Dept: ENDOCRINOLOGY | Facility: CLINIC | Age: 50
End: 2020-06-09

## 2020-06-09 NOTE — TELEPHONE ENCOUNTER
----- Message from Laurie Art sent at 6/9/2020 11:45 AM CDT -----  Contact: self  Pt needs a new order for labs rescheduled. Pt states he missed it due to weather on yesterday. Please call back to further discuss. Pt states if it can be scheduled please schedule for late in the afternoon around 4pm. [place].     Contact Info [phone] (home) [phone] (work)

## 2020-06-10 ENCOUNTER — LAB VISIT (OUTPATIENT)
Dept: LAB | Facility: OTHER | Age: 50
End: 2020-06-10
Attending: STUDENT IN AN ORGANIZED HEALTH CARE EDUCATION/TRAINING PROGRAM
Payer: COMMERCIAL

## 2020-06-10 DIAGNOSIS — E89.0 HYPOTHYROIDISM FOLLOWING RADIOIODINE THERAPY: ICD-10-CM

## 2020-06-10 LAB
T4 FREE SERPL-MCNC: 1.21 NG/DL (ref 0.71–1.51)
TSH SERPL DL<=0.005 MIU/L-ACNC: 2.06 UIU/ML (ref 0.4–4)

## 2020-06-10 PROCEDURE — 84439 ASSAY OF FREE THYROXINE: CPT

## 2020-06-10 PROCEDURE — 84443 ASSAY THYROID STIM HORMONE: CPT

## 2020-06-10 PROCEDURE — 36415 COLL VENOUS BLD VENIPUNCTURE: CPT

## 2020-06-11 DIAGNOSIS — E89.0 HYPOTHYROIDISM FOLLOWING RADIOIODINE THERAPY: Primary | ICD-10-CM

## 2020-06-12 ENCOUNTER — TELEPHONE (OUTPATIENT)
Dept: ENDOCRINOLOGY | Facility: CLINIC | Age: 50
End: 2020-06-12

## 2020-06-12 NOTE — TELEPHONE ENCOUNTER
----- Message from Ciara Randall MA sent at 6/11/2020  4:40 PM CDT -----      ----- Message -----  From: Manuela Quinteros MD  Sent: 6/11/2020   3:42 PM CDT  To: [facility] Staff    Please let pt know that his recent thyroid labs are normal, and he can continue the current dose of levothyroxine.   Will repeat labs in 3 months.     Please schedule labs in 3 months.   Will also send normal lab letter.     Thanks,  Manuela

## 2020-06-12 NOTE — TELEPHONE ENCOUNTER
3mth lab scheduled for 9/11 at 4pm per pt's request.  Informed pt of lab results and provider's recommendation, pt verbalized understanding.

## 2020-06-16 ENCOUNTER — TELEPHONE (OUTPATIENT)
Dept: UROLOGY | Facility: CLINIC | Age: 50
End: 2020-06-16

## 2020-06-16 DIAGNOSIS — N52.01 ERECTILE DYSFUNCTION DUE TO ARTERIAL INSUFFICIENCY: Primary | ICD-10-CM

## 2020-06-16 RX ORDER — TADALAFIL 20 MG/1
20 TABLET ORAL
Qty: 30 TABLET | Refills: 0 | Status: SHIPPED | OUTPATIENT
Start: 2020-06-16 | End: 2020-07-13

## 2020-07-27 ENCOUNTER — OFFICE VISIT (OUTPATIENT)
Dept: UROLOGY | Facility: CLINIC | Age: 50
End: 2020-07-27
Attending: UROLOGY
Payer: COMMERCIAL

## 2020-07-27 VITALS
BODY MASS INDEX: 32.29 KG/M2 | HEART RATE: 70 BPM | HEIGHT: 73 IN | WEIGHT: 243.63 LBS | SYSTOLIC BLOOD PRESSURE: 172 MMHG | DIASTOLIC BLOOD PRESSURE: 112 MMHG

## 2020-07-27 DIAGNOSIS — N52.01 ERECTILE DYSFUNCTION DUE TO ARTERIAL INSUFFICIENCY: ICD-10-CM

## 2020-07-27 PROCEDURE — 99213 OFFICE O/P EST LOW 20 MIN: CPT | Mod: S$GLB,,, | Performed by: UROLOGY

## 2020-07-27 PROCEDURE — 99213 PR OFFICE/OUTPT VISIT, EST, LEVL III, 20-29 MIN: ICD-10-PCS | Mod: S$GLB,,, | Performed by: UROLOGY

## 2020-07-27 PROCEDURE — 3008F BODY MASS INDEX DOCD: CPT | Mod: CPTII,S$GLB,, | Performed by: UROLOGY

## 2020-07-27 PROCEDURE — 3008F PR BODY MASS INDEX (BMI) DOCUMENTED: ICD-10-PCS | Mod: CPTII,S$GLB,, | Performed by: UROLOGY

## 2020-07-27 RX ORDER — OMEGA-3 FATTY ACIDS 1000 MG
2 CAPSULE ORAL
COMMUNITY

## 2020-07-27 RX ORDER — TADALAFIL 20 MG/1
20 TABLET ORAL DAILY PRN
Qty: 30 TABLET | Refills: 11 | Status: SHIPPED | OUTPATIENT
Start: 2020-07-27 | End: 2021-08-24 | Stop reason: SDUPTHER

## 2020-07-27 RX ORDER — BENZONATATE 200 MG/1
CAPSULE ORAL
COMMUNITY
End: 2021-09-29

## 2020-07-27 RX ORDER — CEFDINIR 300 MG/1
CAPSULE ORAL
COMMUNITY
End: 2021-09-29

## 2020-07-27 RX ORDER — IBUPROFEN 100 MG/5ML
1000 SUSPENSION, ORAL (FINAL DOSE FORM) ORAL
COMMUNITY

## 2020-07-27 NOTE — PROGRESS NOTES
"  Subjective:      Gonzalo Bustamante is a 49 y.o. male who returns today regarding his ED.    Doing well w/ cialis and wishes to continue. No c/o. No issues urinating, no gross hematuria.    The following portions of the patient's history were reviewed and updated as appropriate: allergies, current medications, past family history, past medical history, past social history, past surgical history and problem list.    Review of Systems  A comprehensive multipoint review of systems was negative except as otherwise stated in the HPI.     Objective:   Vitals: BP (!) 172/112 (BP Location: Left arm, Patient Position: Sitting, BP Method: Large (Automatic))   Pulse 70   Ht 6' 1" (1.854 m)   Wt 110.5 kg (243 lb 9.7 oz)   BMI 32.14 kg/m²     Physical Exam   General: alert and oriented, no acute distress  Respiratory: Symmetric expansion, non-labored breathing  Neuro: no gross deficits  Psych: normal judgment and insight, normal mood/affect and non-anxious    Lab Review     Lab Results   Component Value Date    WBC 9.55 01/17/2009    HGB 16.0 01/17/2009    HCT 48.7 01/17/2009    MCV 90.0 01/17/2009     01/17/2009     Lab Results   Component Value Date    CREATININE 1.2 01/17/2009    BUN 20 01/17/2009         Assessment and Plan:   1. Erectile dysfunction, unspecified erectile dysfunction type  -- Continue cialis, Rx refilled  -- FU 1 year     "

## 2020-08-26 DIAGNOSIS — E03.9 HYPOTHYROIDISM, UNSPECIFIED TYPE: ICD-10-CM

## 2020-08-26 RX ORDER — LEVOTHYROXINE SODIUM 200 UG/1
200 TABLET ORAL DAILY
Qty: 90 TABLET | Refills: 1 | Status: SHIPPED | OUTPATIENT
Start: 2020-08-26 | End: 2021-01-14 | Stop reason: SDUPTHER

## 2020-09-01 ENCOUNTER — HOSPITAL ENCOUNTER (OUTPATIENT)
Dept: RADIOLOGY | Facility: OTHER | Age: 50
Discharge: HOME | End: 2020-09-01
Attending: ORTHOPAEDIC SURGERY
Payer: COMMERCIAL

## 2020-09-01 DIAGNOSIS — S43.432A LABRAL TEAR OF SHOULDER, LEFT, INITIAL ENCOUNTER: ICD-10-CM

## 2020-10-29 NOTE — H&P
Gonzalo Bustamante is a 48 y.o., male scheduled for right 3rd toe I&D with antibiotic implant insertion and bone biopsy under MAC on 2/11/19.    Needs PCP H&P.    Past Medical History:   Diagnosis Date    Graves disease     Hypothyroidism      Past Surgical History:   Procedure Laterality Date    BUNIONECTOMY Bilateral     SKIN CANCER EXCISION      melanoma back 2012    WISDOM TOOTH EXTRACTION       Anesthesia Evaluation    I have reviewed the Patient Summary Reports.    I have reviewed the Nursing Notes.   I have reviewed the Medications.     Review of Systems  Anesthesia Hx:  No problems with previous Anesthesia    Social:  Smoker, Social Alcohol Use    Hematology/Oncology:  Hematology Normal        Cardiovascular:  Cardiovascular Normal Exercise tolerance: good   Denies Angina.        Pulmonary:  Pulmonary Normal    Renal/:  Renal/ Normal     Hepatic/GI:  Hepatic/GI Normal Denies Liver Disease.    Neurological:  Neurology Normal Denies Seizures.    Endocrine:   Hypothyroidism        Physical Exam  General:  Well nourished    Airway/Jaw/Neck:  Airway Findings: Mouth Opening: Normal Tongue: Normal  General Airway Assessment: Adult  Mallampati: II       Chest/Lungs:  Chest/Lungs Findings: Clear to auscultation, Normal Respiratory Rate     Heart/Vascular:  Heart Findings: Rate: Normal  Rhythm: Regular Rhythm  Sounds: Normal  Heart murmur: negative       Mental Status:  Mental Status Findings:  Cooperative, Alert and Oriented         Anesthesia Plan  Type of Anesthesia, risks & benefits discussed:  Anesthesia Type:  MAC, regional, general  Patient's Preference:   Intra-op Monitoring Plan: standard ASA monitors  Intra-op Monitoring Plan Comments:   Post Op Pain Control Plan: per primary service following discharge from PACU  Post Op Pain Control Plan Comments:   Induction:   IV  Beta Blocker:  Patient is not currently on a Beta-Blocker (No further documentation required).       Informed Consent: Patient  Reason for Disposition   Patient wants to be seen    Answer Assessment - Initial Assessment Questions  1. LOCATION: \"Where does it hurt? \"       Front and middle    2. ONSET: \"When did the headache start? \" (Minutes, hours or days)       Three days    3. PATTERN: \"Does the pain come and go, or has it been constant since it started? \"      Comes and goes    4. SEVERITY: \"How bad is the pain? \" and \"What does it keep you from doing? \"  (e.g., Scale 1-10; mild, moderate, or severe)    - MILD (1-3): doesn't interfere with normal activities     - MODERATE (4-7): interferes with normal activities or awakens from sleep     - SEVERE (8-10): excruciating pain, unable to do any normal activities         Moderate pain    5. RECURRENT SYMPTOM: \"Have you ever had headaches before? \" If so, ask: \"When was the last time? \" and \"What happened that time? \"       Yes usually take migraine medication but this time not working    6. CAUSE: \"What do you think is causing the headache? \"      Weather related    7. MIGRAINE: \"Have you been diagnosed with migraine headaches? \" If so, ask: \"Is this headache similar? \"       Yes it is like other migraines    8. HEAD INJURY: \"Has there been any recent injury to the head? \"       No    9. OTHER SYMPTOMS: \"Do you have any other symptoms? \" (fever, stiff neck, eye pain, sore throat, cold symptoms)      Pain goes into neck and shoulders, nausea, photophobia    10. PREGNANCY: \"Is there any chance you are pregnant? \" \"When was your last menstrual period? \"        no    Protocols used: HEADACHE-ADULT-[place]    Attention Provider: Thank you for allowing me to participate in the care of your patient. The patient was connected to triage in response to information provided to the [facility]. Please do not respond through this encounter as the response is not directed to a shared pool.      Warm transfer to Joi understands risks and agrees with Anesthesia plan.  Questions answered. Anesthesia consent signed with patient.  ASA Score: 2     Day of Surgery Review of History & Physical:     H&P completed by Anesthesiologist.   Anesthesia Plan Notes:           Ready For Surgery From Anesthesia Perspective.

## 2021-01-14 ENCOUNTER — TELEPHONE (OUTPATIENT)
Dept: ENDOCRINOLOGY | Facility: CLINIC | Age: 51
End: 2021-01-14

## 2021-01-14 DIAGNOSIS — E03.9 HYPOTHYROIDISM, UNSPECIFIED TYPE: ICD-10-CM

## 2021-01-14 RX ORDER — LEVOTHYROXINE SODIUM 200 UG/1
200 TABLET ORAL DAILY
Qty: 30 TABLET | Refills: 1 | Status: SHIPPED | OUTPATIENT
Start: 2021-01-14 | End: 2021-01-14

## 2021-02-08 ENCOUNTER — LAB VISIT (OUTPATIENT)
Dept: LAB | Facility: OTHER | Age: 51
End: 2021-02-08
Attending: INTERNAL MEDICINE
Payer: COMMERCIAL

## 2021-02-08 DIAGNOSIS — E89.0 HYPOTHYROIDISM FOLLOWING RADIOIODINE THERAPY: ICD-10-CM

## 2021-02-08 LAB
T4 FREE SERPL-MCNC: 1.14 NG/DL (ref 0.71–1.51)
TSH SERPL DL<=0.005 MIU/L-ACNC: 0.8 UIU/ML (ref 0.4–4)

## 2021-02-08 PROCEDURE — 36415 COLL VENOUS BLD VENIPUNCTURE: CPT

## 2021-02-08 PROCEDURE — 84439 ASSAY OF FREE THYROXINE: CPT

## 2021-02-08 PROCEDURE — 84443 ASSAY THYROID STIM HORMONE: CPT

## 2021-05-14 ENCOUNTER — OFFICE VISIT (OUTPATIENT)
Dept: ENDOCRINOLOGY | Facility: CLINIC | Age: 51
End: 2021-05-14
Payer: COMMERCIAL

## 2021-05-14 VITALS
TEMPERATURE: 98 F | SYSTOLIC BLOOD PRESSURE: 134 MMHG | DIASTOLIC BLOOD PRESSURE: 91 MMHG | HEART RATE: 70 BPM | BODY MASS INDEX: 33.02 KG/M2 | HEIGHT: 73 IN | WEIGHT: 249.13 LBS

## 2021-05-14 DIAGNOSIS — E89.0 POSTABLATIVE HYPOTHYROIDISM: Primary | ICD-10-CM

## 2021-05-14 DIAGNOSIS — E03.9 HYPOTHYROIDISM, UNSPECIFIED TYPE: ICD-10-CM

## 2021-05-14 DIAGNOSIS — E66.09 CLASS 1 OBESITY DUE TO EXCESS CALORIES WITHOUT SERIOUS COMORBIDITY WITH BODY MASS INDEX (BMI) OF 32.0 TO 32.9 IN ADULT: ICD-10-CM

## 2021-05-14 DIAGNOSIS — E55.9 VITAMIN D INSUFFICIENCY: ICD-10-CM

## 2021-05-14 DIAGNOSIS — I10 ESSENTIAL HYPERTENSION: ICD-10-CM

## 2021-05-14 PROBLEM — M86.171 OSTEOMYELITIS OF FOOT, RIGHT, ACUTE: Status: RESOLVED | Noted: 2019-02-11 | Resolved: 2021-05-14

## 2021-05-14 PROBLEM — E66.811 CLASS 1 OBESITY DUE TO EXCESS CALORIES WITHOUT SERIOUS COMORBIDITY WITH BODY MASS INDEX (BMI) OF 32.0 TO 32.9 IN ADULT: Status: ACTIVE | Noted: 2021-05-14

## 2021-05-14 PROCEDURE — 99214 OFFICE O/P EST MOD 30 MIN: CPT | Mod: S$GLB,,, | Performed by: INTERNAL MEDICINE

## 2021-05-14 PROCEDURE — 99214 PR OFFICE/OUTPT VISIT, EST, LEVL IV, 30-39 MIN: ICD-10-PCS | Mod: S$GLB,,, | Performed by: INTERNAL MEDICINE

## 2021-05-14 PROCEDURE — 3008F BODY MASS INDEX DOCD: CPT | Mod: CPTII,S$GLB,, | Performed by: INTERNAL MEDICINE

## 2021-05-14 PROCEDURE — 3008F PR BODY MASS INDEX (BMI) DOCUMENTED: ICD-10-PCS | Mod: CPTII,S$GLB,, | Performed by: INTERNAL MEDICINE

## 2021-05-14 PROCEDURE — 1126F PR PAIN SEVERITY QUANTIFIED, NO PAIN PRESENT: ICD-10-PCS | Mod: S$GLB,,, | Performed by: INTERNAL MEDICINE

## 2021-05-14 PROCEDURE — 1126F AMNT PAIN NOTED NONE PRSNT: CPT | Mod: S$GLB,,, | Performed by: INTERNAL MEDICINE

## 2021-05-14 RX ORDER — LEVOTHYROXINE SODIUM 200 UG/1
TABLET ORAL
Qty: 90 TABLET | Refills: 3 | Status: SHIPPED | OUTPATIENT
Start: 2021-05-14 | End: 2022-05-26

## 2021-08-24 DIAGNOSIS — N52.01 ERECTILE DYSFUNCTION DUE TO ARTERIAL INSUFFICIENCY: Primary | ICD-10-CM

## 2021-08-24 RX ORDER — TADALAFIL 20 MG/1
20 TABLET ORAL
Qty: 30 TABLET | Refills: 11 | Status: SHIPPED | OUTPATIENT
Start: 2021-08-24 | End: 2022-09-02 | Stop reason: SDUPTHER

## 2021-09-29 ENCOUNTER — OFFICE VISIT (OUTPATIENT)
Dept: URGENT CARE | Facility: CLINIC | Age: 51
End: 2021-09-29
Payer: COMMERCIAL

## 2021-09-29 VITALS
HEART RATE: 67 BPM | OXYGEN SATURATION: 96 % | WEIGHT: 240 LBS | HEIGHT: 73 IN | RESPIRATION RATE: 16 BRPM | SYSTOLIC BLOOD PRESSURE: 159 MMHG | TEMPERATURE: 98 F | DIASTOLIC BLOOD PRESSURE: 107 MMHG | BODY MASS INDEX: 31.81 KG/M2

## 2021-09-29 DIAGNOSIS — J06.9 VIRAL URI WITH COUGH: Primary | ICD-10-CM

## 2021-09-29 DIAGNOSIS — J02.9 SORE THROAT: ICD-10-CM

## 2021-09-29 LAB
CTP QC/QA: YES
CTP QC/QA: YES
MOLECULAR STREP A: NEGATIVE
SARS-COV-2 RDRP RESP QL NAA+PROBE: NEGATIVE

## 2021-09-29 PROCEDURE — 87651 STREP A DNA AMP PROBE: CPT | Mod: QW,S$GLB,, | Performed by: SURGERY

## 2021-09-29 PROCEDURE — 3008F PR BODY MASS INDEX (BMI) DOCUMENTED: ICD-10-PCS | Mod: CPTII,S$GLB,, | Performed by: SURGERY

## 2021-09-29 PROCEDURE — 3077F SYST BP >= 140 MM HG: CPT | Mod: CPTII,S$GLB,, | Performed by: SURGERY

## 2021-09-29 PROCEDURE — U0002: ICD-10-PCS | Mod: QW,S$GLB,, | Performed by: SURGERY

## 2021-09-29 PROCEDURE — 87651 POCT STREP A MOLECULAR: ICD-10-PCS | Mod: QW,S$GLB,, | Performed by: SURGERY

## 2021-09-29 PROCEDURE — 3077F PR MOST RECENT SYSTOLIC BLOOD PRESSURE >= 140 MM HG: ICD-10-PCS | Mod: CPTII,S$GLB,, | Performed by: SURGERY

## 2021-09-29 PROCEDURE — 1160F RVW MEDS BY RX/DR IN RCRD: CPT | Mod: CPTII,S$GLB,, | Performed by: SURGERY

## 2021-09-29 PROCEDURE — 1159F PR MEDICATION LIST DOCUMENTED IN MEDICAL RECORD: ICD-10-PCS | Mod: CPTII,S$GLB,, | Performed by: SURGERY

## 2021-09-29 PROCEDURE — 3080F PR MOST RECENT DIASTOLIC BLOOD PRESSURE >= 90 MM HG: ICD-10-PCS | Mod: CPTII,S$GLB,, | Performed by: SURGERY

## 2021-09-29 PROCEDURE — 3008F BODY MASS INDEX DOCD: CPT | Mod: CPTII,S$GLB,, | Performed by: SURGERY

## 2021-09-29 PROCEDURE — 99214 PR OFFICE/OUTPT VISIT, EST, LEVL IV, 30-39 MIN: ICD-10-PCS | Mod: S$GLB,,, | Performed by: SURGERY

## 2021-09-29 PROCEDURE — U0002 COVID-19 LAB TEST NON-CDC: HCPCS | Mod: QW,S$GLB,, | Performed by: SURGERY

## 2021-09-29 PROCEDURE — 1160F PR REVIEW ALL MEDS BY PRESCRIBER/CLIN PHARMACIST DOCUMENTED: ICD-10-PCS | Mod: CPTII,S$GLB,, | Performed by: SURGERY

## 2021-09-29 PROCEDURE — 99214 OFFICE O/P EST MOD 30 MIN: CPT | Mod: S$GLB,,, | Performed by: SURGERY

## 2021-09-29 PROCEDURE — 3080F DIAST BP >= 90 MM HG: CPT | Mod: CPTII,S$GLB,, | Performed by: SURGERY

## 2021-09-29 PROCEDURE — 1159F MED LIST DOCD IN RCRD: CPT | Mod: CPTII,S$GLB,, | Performed by: SURGERY

## 2022-03-04 ENCOUNTER — OFFICE VISIT (OUTPATIENT)
Dept: URGENT CARE | Facility: CLINIC | Age: 52
End: 2022-03-04
Payer: COMMERCIAL

## 2022-03-04 VITALS
TEMPERATURE: 98 F | WEIGHT: 240 LBS | BODY MASS INDEX: 31.81 KG/M2 | DIASTOLIC BLOOD PRESSURE: 107 MMHG | HEIGHT: 73 IN | OXYGEN SATURATION: 95 % | SYSTOLIC BLOOD PRESSURE: 153 MMHG | RESPIRATION RATE: 16 BRPM | HEART RATE: 82 BPM

## 2022-03-04 DIAGNOSIS — H66.93 BILATERAL OTITIS MEDIA, UNSPECIFIED OTITIS MEDIA TYPE: ICD-10-CM

## 2022-03-04 DIAGNOSIS — H60.501 ACUTE OTITIS EXTERNA OF RIGHT EAR, UNSPECIFIED TYPE: Primary | ICD-10-CM

## 2022-03-04 DIAGNOSIS — H92.09 OTALGIA, UNSPECIFIED LATERALITY: ICD-10-CM

## 2022-03-04 LAB
CTP QC/QA: YES
SARS-COV-2 RDRP RESP QL NAA+PROBE: NEGATIVE

## 2022-03-04 PROCEDURE — 1160F RVW MEDS BY RX/DR IN RCRD: CPT | Mod: CPTII,S$GLB,,

## 2022-03-04 PROCEDURE — 3008F BODY MASS INDEX DOCD: CPT | Mod: CPTII,S$GLB,,

## 2022-03-04 PROCEDURE — 3080F DIAST BP >= 90 MM HG: CPT | Mod: CPTII,S$GLB,,

## 2022-03-04 PROCEDURE — 1159F PR MEDICATION LIST DOCUMENTED IN MEDICAL RECORD: ICD-10-PCS | Mod: CPTII,S$GLB,,

## 2022-03-04 PROCEDURE — 1159F MED LIST DOCD IN RCRD: CPT | Mod: CPTII,S$GLB,,

## 2022-03-04 PROCEDURE — 99213 PR OFFICE/OUTPT VISIT, EST, LEVL III, 20-29 MIN: ICD-10-PCS | Mod: S$GLB,,,

## 2022-03-04 PROCEDURE — 99213 OFFICE O/P EST LOW 20 MIN: CPT | Mod: S$GLB,,,

## 2022-03-04 PROCEDURE — 1160F PR REVIEW ALL MEDS BY PRESCRIBER/CLIN PHARMACIST DOCUMENTED: ICD-10-PCS | Mod: CPTII,S$GLB,,

## 2022-03-04 PROCEDURE — 3077F PR MOST RECENT SYSTOLIC BLOOD PRESSURE >= 140 MM HG: ICD-10-PCS | Mod: CPTII,S$GLB,,

## 2022-03-04 PROCEDURE — 3080F PR MOST RECENT DIASTOLIC BLOOD PRESSURE >= 90 MM HG: ICD-10-PCS | Mod: CPTII,S$GLB,,

## 2022-03-04 PROCEDURE — 3077F SYST BP >= 140 MM HG: CPT | Mod: CPTII,S$GLB,,

## 2022-03-04 PROCEDURE — U0002 COVID-19 LAB TEST NON-CDC: HCPCS | Mod: QW,S$GLB,,

## 2022-03-04 PROCEDURE — U0002: ICD-10-PCS | Mod: QW,S$GLB,,

## 2022-03-04 PROCEDURE — 3008F PR BODY MASS INDEX (BMI) DOCUMENTED: ICD-10-PCS | Mod: CPTII,S$GLB,,

## 2022-03-04 RX ORDER — CIPROFLOXACIN AND DEXAMETHASONE 3; 1 MG/ML; MG/ML
4 SUSPENSION/ DROPS AURICULAR (OTIC) 2 TIMES DAILY
Qty: 7.5 ML | Refills: 0 | Status: SHIPPED | OUTPATIENT
Start: 2022-03-04 | End: 2022-03-04 | Stop reason: ALTCHOICE

## 2022-03-04 RX ORDER — CEFDINIR 300 MG/1
300 CAPSULE ORAL 2 TIMES DAILY
Qty: 20 CAPSULE | Refills: 0 | Status: SHIPPED | OUTPATIENT
Start: 2022-03-04 | End: 2022-03-14

## 2022-03-04 RX ORDER — NEOMYCIN SULFATE, POLYMYXIN B SULFATE AND HYDROCORTISONE 10; 3.5; 1 MG/ML; MG/ML; [USP'U]/ML
3 SUSPENSION/ DROPS AURICULAR (OTIC) 3 TIMES DAILY
Qty: 5 ML | Refills: 0 | Status: SHIPPED | OUTPATIENT
Start: 2022-03-04 | End: 2022-03-11

## 2022-03-04 NOTE — PROGRESS NOTES
"Subjective:       Patient ID: Gonzalo Bustamante is a 51 y.o. male.    Vitals:  height is 6' 1" (1.854 m) and weight is 108.9 kg (240 lb). His temperature is 98.3 °F (36.8 °C). His blood pressure is 153/107 (abnormal) and his pulse is 82. His respiration is 16 and oxygen saturation is 95%.     Chief Complaint: Otalgia    50 yo male presetns with complaints of  R ear pain x 4 days. Notes ear pain is constant and tender to touch. Also notes congestion, productive cough, sinus pressure, sore throat. Covid vaccinated. No known covid exposures. Treating sx with gargles, benadryl. No ear drainage, tinnitus, hearing loss, fever, chills, chest pain, shortness of breath.     Otalgia   There is pain in the right ear. This is a new problem. The current episode started in the past 7 days. There has been no fever. Associated symptoms include coughing and a sore throat.       HENT: Positive for ear pain, congestion and sore throat. Negative for postnasal drip, sinus pain and sinus pressure.    Cardiovascular: Negative for chest pain and sob on exertion.   Respiratory: Positive for cough. Negative for shortness of breath.        Objective:      Physical Exam   Constitutional: He is oriented to person, place, and time. He appears well-developed. He is cooperative.  Non-toxic appearance. He does not appear ill. No distress.   HENT:   Head: Normocephalic and atraumatic.   Ears:   Right Ear: Hearing, tympanic membrane, external ear and ear canal normal. There is swelling (swelling of ear canal, not able to pass otoscope, no drainage.) and tenderness. No drainage.   Left Ear: Hearing, external ear and ear canal normal. Tympanic membrane is erythematous and bulging.  No middle ear effusion.      Comments: Unable to visualize TM due to swelling of ear canal.   Nose: Congestion present. No mucosal edema, rhinorrhea or nasal deformity. No epistaxis. Right sinus exhibits no maxillary sinus tenderness and no frontal sinus tenderness. " Left sinus exhibits no maxillary sinus tenderness and no frontal sinus tenderness.   Mouth/Throat: Uvula is midline and mucous membranes are normal. Mucous membranes are moist. No trismus in the jaw. Normal dentition. No uvula swelling. Posterior oropharyngeal erythema present. No oropharyngeal exudate.   Eyes: Conjunctivae and lids are normal. Right eye exhibits no discharge. Left eye exhibits no discharge. No scleral icterus.   Neck: Trachea normal and phonation normal. Neck supple.   Cardiovascular: Normal rate, regular rhythm, normal heart sounds and normal pulses.   Pulmonary/Chest: Effort normal and breath sounds normal. No respiratory distress.   Abdominal: Normal appearance and bowel sounds are normal. He exhibits no distension and no mass. Soft. There is no abdominal tenderness.   Musculoskeletal: Normal range of motion.         General: No deformity. Normal range of motion.   Neurological: He is alert and oriented to person, place, and time. He exhibits normal muscle tone. Coordination normal.   Skin: Skin is warm, dry, intact, not diaphoretic and not pale.   Psychiatric: His speech is normal and behavior is normal. Judgment and thought content normal.   Nursing note and vitals reviewed.        Results for orders placed or performed in visit on 03/04/22   POCT COVID-19 Rapid Screening   Result Value Ref Range    POC Rapid COVID Negative Negative     Acceptable Yes        Assessment:       1. Acute otitis externa of right ear, unspecified type    2. Otalgia, unspecified laterality    3. Bilateral otitis media, unspecified otitis media type          Plan:       Reviewed diagnosis of an ear infection with patient who verbalized understanding.  We discussed the treatment plan which also included antibiotics and over-the-counter medications to help with allergy symptoms as well.  Patient given topical drop with steroid to aid in inflammation of ear canal. Patient verbalized understanding agrees  with plan of care.  He denied any further questions or concerns at this time.  Patient exits exam room in no acute distress.    Recommended OTC medications for congestion, sore throat and post nasal drip symptoms.   Acute otitis externa of right ear, unspecified type  -     Discontinue: ciprofloxacin-dexamethasone 0.3-0.1% (CIPRODEX) 0.3-0.1 % DrpS; Place 4 drops into both ears 2 (two) times daily. for 7 days  Dispense: 7.5 mL; Refill: 0  -     neomycin-polymyxin-hydrocortisone (CORTISPORIN) 3.5-10,000-1 mg/mL-unit/mL-% otic suspension; Place 3 drops into the right ear 3 (three) times daily. for 7 days  Dispense: 5 mL; Refill: 0    Otalgia, unspecified laterality  -     POCT COVID-19 Rapid Screening    Bilateral otitis media, unspecified otitis media type  -     cefdinir (OMNICEF) 300 MG capsule; Take 1 capsule (300 mg total) by mouth 2 (two) times daily. for 10 days  Dispense: 20 capsule; Refill: 0

## 2022-05-26 DIAGNOSIS — E89.0 POSTABLATIVE HYPOTHYROIDISM: ICD-10-CM

## 2022-05-26 RX ORDER — LEVOTHYROXINE SODIUM 200 UG/1
TABLET ORAL
Qty: 90 TABLET | Refills: 0 | Status: SHIPPED | OUTPATIENT
Start: 2022-05-26 | End: 2022-09-19 | Stop reason: SDUPTHER

## 2022-09-02 DIAGNOSIS — N52.01 ERECTILE DYSFUNCTION DUE TO ARTERIAL INSUFFICIENCY: ICD-10-CM

## 2022-09-02 RX ORDER — TADALAFIL 20 MG/1
20 TABLET ORAL
Qty: 30 TABLET | Refills: 11 | Status: SHIPPED | OUTPATIENT
Start: 2022-09-02

## 2022-09-19 DIAGNOSIS — E89.0 POSTABLATIVE HYPOTHYROIDISM: Primary | ICD-10-CM

## 2022-09-19 RX ORDER — LEVOTHYROXINE SODIUM 200 UG/1
TABLET ORAL
Qty: 30 TABLET | Refills: 0 | Status: SHIPPED | OUTPATIENT
Start: 2022-09-19 | End: 2022-10-19

## 2022-09-26 ENCOUNTER — TELEPHONE (OUTPATIENT)
Dept: ENDOCRINOLOGY | Facility: CLINIC | Age: 52
End: 2022-09-26
Payer: COMMERCIAL

## 2022-10-19 DIAGNOSIS — E89.0 POSTABLATIVE HYPOTHYROIDISM: ICD-10-CM

## 2022-10-19 RX ORDER — LEVOTHYROXINE SODIUM 200 UG/1
TABLET ORAL
Qty: 30 TABLET | Refills: 3 | Status: SHIPPED | OUTPATIENT
Start: 2022-10-19 | End: 2023-02-27 | Stop reason: SDUPTHER

## 2022-10-19 NOTE — TELEPHONE ENCOUNTER
----- Message from Jennifer Gill MA sent at 10/19/2022  2:05 PM CDT -----  Regarding: FW: refill  Contact: pt @ [phone]    ----- Message -----  From: Alana Salvador  Sent: 10/19/2022   2:03 PM CDT  To: Sanjana PEREA Staff  Subject: refill                                           Rx Refill/Request    Is this a Refill or New Rx:  refill    Rx Name and Strength:  levothyroxine (SYNTHROID) 200 MCG tablet    Preferred Pharmacy with phone number:    [id number] [place] [address] [employer] [facility] [place] [address] [place] [address] [facility] [address]  Phone: [phone] Fax: [phone]            Communication Preference:  Additional Information: Pt has 4 days of medication and will like to have a 90 or 120 day supply due to his appointment not being until 1/27

## 2022-10-19 NOTE — TELEPHONE ENCOUNTER
Refill sent in. Cancelled his September appointment.  If cancels again will need to f/u with PCP for additional refills

## 2023-01-20 ENCOUNTER — TELEPHONE (OUTPATIENT)
Dept: ENDOCRINOLOGY | Facility: CLINIC | Age: 53
End: 2023-01-20
Payer: COMMERCIAL

## 2023-01-20 NOTE — TELEPHONE ENCOUNTER
----- Message from Luis Carlos Leon sent at 1/20/2023 11:15 AM CST -----  Contact: @[phone]  Pt is calling in to see if her had physical type appt with Dr. Jerome last year an if not can he bring in a form for his appt to get filled out for his job. Please call to discuss further.

## 2023-01-24 ENCOUNTER — TELEPHONE (OUTPATIENT)
Dept: ENDOCRINOLOGY | Facility: CLINIC | Age: 53
End: 2023-01-24
Payer: COMMERCIAL

## 2023-01-24 NOTE — TELEPHONE ENCOUNTER
Staff left a message for the pt to contact the office about rescheduling his appt for tomorrow. The next available about is not until May 2023

## 2023-02-24 DIAGNOSIS — E89.0 POSTABLATIVE HYPOTHYROIDISM: ICD-10-CM

## 2023-02-24 RX ORDER — LEVOTHYROXINE SODIUM 200 UG/1
TABLET ORAL
Qty: 30 TABLET | Refills: 3 | OUTPATIENT
Start: 2023-02-24

## 2023-02-24 NOTE — TELEPHONE ENCOUNTER
LV 5/14/21. LL 2/8/21.    Urgent Refill request. Pt states he has been out of his meds for 5 days and he is starting to feel bad/ pt would also like a call back as well.

## 2023-02-24 NOTE — TELEPHONE ENCOUNTER
----- Message from Anamika Lara sent at 2/24/2023  2:43 PM CST -----  Contact: Pt  Urgent Refill request. Pt states he has been out of his meds for 5 days and he is starting to feel bad/ pt would also like a call back as well    levothyroxine (SYNTHROID) 200 MCG tablet      [id number] - [place], [place] - [address] [place] [address]  Phone: [phone] Fax: [phone]

## 2023-02-27 RX ORDER — LEVOTHYROXINE SODIUM 200 UG/1
TABLET ORAL
Qty: 14 TABLET | Refills: 0 | Status: SHIPPED | OUTPATIENT
Start: 2023-02-27 | End: 2023-03-17 | Stop reason: SDUPTHER

## 2023-03-13 DIAGNOSIS — E89.0 POSTABLATIVE HYPOTHYROIDISM: ICD-10-CM

## 2023-03-13 RX ORDER — LEVOTHYROXINE SODIUM 200 UG/1
TABLET ORAL
Qty: 14 TABLET | Refills: 0 | OUTPATIENT
Start: 2023-03-13

## 2023-03-16 ENCOUNTER — TELEPHONE (OUTPATIENT)
Dept: ENDOCRINOLOGY | Facility: CLINIC | Age: 53
End: 2023-03-16
Payer: COMMERCIAL

## 2023-03-16 NOTE — TELEPHONE ENCOUNTER
LVM informing pt his most recent refill requests have been denied due to history of cancelled endo appointments. Also informed pt I will send his most recent message to endo doctors within [facility] facilities since Dr Shipman has no open availability.   
yes

## 2023-03-17 ENCOUNTER — TELEPHONE (OUTPATIENT)
Dept: ENDOCRINOLOGY | Facility: CLINIC | Age: 53
End: 2023-03-17
Payer: COMMERCIAL

## 2023-03-17 DIAGNOSIS — E89.0 POSTABLATIVE HYPOTHYROIDISM: ICD-10-CM

## 2023-03-17 RX ORDER — LEVOTHYROXINE SODIUM 200 UG/1
TABLET ORAL
Qty: 30 TABLET | Refills: 1 | Status: SHIPPED | OUTPATIENT
Start: 2023-03-17 | End: 2023-05-26 | Stop reason: SDUPTHER

## 2023-03-17 NOTE — TELEPHONE ENCOUNTER
Good morning , I keep getting a message to have Dr Tammy Rivas to refill this patients Rx. Dr Tammy Rivas has not see this patient they are scheduled to see her in April. Dr Shipman is the last doctor to see this patient. Dr Shipman can you refill the patients Rx to have enough  until Dr Rivas see's the patient? That is usually what our doctor do. Thank you have a great day.

## 2023-03-17 NOTE — TELEPHONE ENCOUNTER
----- Message from Sushant Howard sent at 3/17/2023 10:02 AM CDT -----  Regarding: URGENT.. RX needed  Contact: PT @ [phone]  Rx Refill/Request    Is this a Refill or New Rx: Refill..     Rx Name and Strength: levothyroxine (SYNTHROID) 200 MCG tablet    Preferred Pharmacy with phone number:     [id number] - [place], [place] - [address] [place] [address]  Phone: [phone] Fax: [phone]    Communication Preference: PT @ [phone]    Additional Information: Pt states that this is urgent and has been w/o this rx for about a week and it effects him at work, since he has no rx to take. Pt states that he has been advised by someone in the office that Dr. Allan will not see him or refill his rx, since he has missed 3 appts.     Pt states that one of those appts, was canceled by the office. Pt is asking to please send rx to the pharmacy today and please call him to advise. Thanks.

## 2023-03-21 NOTE — TELEPHONE ENCOUNTER
Pt last seen 2 years ago, never came back for follow-up with several attempts/requests to schedule something. Already has refill for 2 months sent from last week.    Okay for f/u PRN if pt interested, otherwise can f/u with PCP/other location if needed.

## 2023-04-24 NOTE — PROGRESS NOTES
Endocrinology Return Visit  04/25/2023      Subjective:      Chief Complaint: Hypothyroidism    HPI: Gonzalo Bustamante is a 52 y.o. male who is here for a follow-up evaluation for thyroid. Last seen by Dr Allan in 5/2021. This is his first visit with me.     At that time he was c/o weight gain and suspected thyroid related. However, TSH was normal and levothyroxine dose was not adjusted. Follow-up in 1 yr was recommended. After that appt he canceled and/or missed several follow-up appointments with Dr Allan. In 3/2023 Dr Allan refilled LT4 for 2mo and stated he needed f/u with someone (endo or PCP) for additional refills.      With regards to his hypothyroidism:  Hx Graves disease. Treated with NAGY at 19 years old, now with post-ablative hypothyroidism.    Current medication:  levothyroxine  Current dose: 200 mcg - no recent dose adjustments     Pt takes thyroid medication properly, in the early morning on an empty stomach. Denies missed doses/running out.  Very rarely misses a dose but doubles the dose the next day      Had US 12/2018, no nodules    Lab Results   Component Value Date    TSH 0.804 02/08/2021    FREET4 1.14 02/08/2021     Symptoms  No   Yes  []    [x]  Weight gain - he attributes this to lifestyle   [x]    []  Fatigue  [x]    []  Constipation  [x]    []  Cold intolerance  [x]    []  Weight loss  [x]    []  Insomnia  [x]    []  Diarrhea  [x]    []  Heat intolerance  [x]    []  Palpitations  [x]    []  Tremor  [x]    []  Vision changes  Feeling okay overall.    Reviewed past medical, family, social history and updated as appropriate.    ROS: As above    Objective:     Vitals:    04/25/23 0938   BP: (!) 160/120   Pulse: 65     BP Readings from Last 5 Encounters:   04/25/23 (!) 160/120   03/04/22 (!) 153/107   09/29/21 (!) 159/107   05/14/21 (!) 134/91   07/27/20 (!) 172/112     Physical Exam  Vitals reviewed.   Constitutional:       General: He is not in acute distress.     Appearance: He is  obese.   HENT:      Mouth/Throat:      Mouth: Mucous membranes are moist.   Eyes:      Conjunctiva/sclera: Conjunctivae normal.   Neck:      Thyroid: No thyromegaly.   Cardiovascular:      Heart sounds: Normal heart sounds.   Pulmonary:      Effort: Pulmonary effort is normal.   Musculoskeletal:         General: No swelling.      Cervical back: Normal range of motion and neck supple.   Neurological:      General: No focal deficit present.      Mental Status: He is oriented to person, place, and time.     Wt Readings from Last 10 Encounters:   04/25/23 0938 113.9 kg (250 lb 15.9 oz)   03/04/22 1001 108.9 kg (240 lb)   09/29/21 1002 108.9 kg (240 lb)   05/14/21 1422 113 kg (249 lb 1.9 oz)   07/27/20 1541 110.5 kg (243 lb 9.7 oz)   02/05/20 1421 110.5 kg (243 lb 8 oz)   02/11/19 0626 111.1 kg (245 lb)   02/08/19 1441 111.5 kg (245 lb 14.8 oz)   12/10/18 1318 111.5 kg (245 lb 13 oz)   08/22/18 1114 103.4 kg (228 lb)   08/22/18 1110 103.4 kg (228 lb)       Lab Results   Component Value Date     01/17/2009    K 4.6 01/17/2009     01/17/2009    CO2 27 01/17/2009    GLU 97 01/17/2009    BUN 20 01/17/2009    CREATININE 1.2 01/17/2009    CALCIUM 10.4 01/17/2009    PROT 7.2 01/17/2009    ALBUMIN 5.1 01/17/2009    BILITOT 0.6 01/17/2009    ALKPHOS 54 (L) 01/17/2009    AST 14 01/17/2009    ALT 21 01/17/2009    TSH 0.804 02/08/2021      Assessment/Plan:     Postablative hypothyroidism  Lab Results   Component Value Date    TSH 0.804 02/08/2021    FREET4 1.14 02/08/2021     Last labs normal but has been >1yr since we have checked TFTs - will repeat now  Clinically having weight gain but denies other symptoms    Recheck TSH today and if normal - will continue LT4 200 mcg -- will wait to send Rx until lab results in case dose needs to be adjusted   Goal normal TSH  Avoid iatrogenic hyperthyroidism due to cardiac, bone, other risks.  As long as TSH is normal and no dose adjustment, repeat TSH annually. If he requires dose  adjustment, will recheck sooner     Essential hypertension  bp high today on multiple checks with manual BP cuff  Has been high consistently for Dr Allan on multiple visits in last several years - not on meds  Dr Allan discussed with the pt multiple times the harms of untreated HTN and recommendation to f/u with PCP but pt has declined every time  Today we reiterated this recommendation and stated he warrants more urgent eval due to significantly elevated DBP (160/120) -- pt adamantly refuses this  I will refer to internal med to establish with PCP - he states his endocrinologist has been his PCP and he does not have one otherwise. I explained to him that we are not his PCP and the importance of establishing with a PCP asap.   Pt asked to check cholesterol with TFTs - so this was ordered  f/u with primary care provider, monitor BP, in the meantime work on healthy diet, decrease salt. Weight loss.    Class 1 obesity due to excess calories without serious comorbidity with body mass index (BMI) of 32.0 to 32.9 in adult  bmi 33.1  complicated by HTN  Discussed importance of weight loss -- he needs a PCP      Follow up in about 1 year (around 4/25/2024).      Tammy Rivas MD  [facility], 6th Floor  [address] [place], [place], [address]    Office: [phone]  Fax: [phone]

## 2023-04-25 ENCOUNTER — OFFICE VISIT (OUTPATIENT)
Dept: ENDOCRINOLOGY | Facility: CLINIC | Age: 53
End: 2023-04-25
Payer: COMMERCIAL

## 2023-04-25 VITALS
DIASTOLIC BLOOD PRESSURE: 120 MMHG | WEIGHT: 251 LBS | OXYGEN SATURATION: 98 % | BODY MASS INDEX: 33.27 KG/M2 | SYSTOLIC BLOOD PRESSURE: 160 MMHG | HEART RATE: 65 BPM | HEIGHT: 73 IN

## 2023-04-25 DIAGNOSIS — I10 ESSENTIAL HYPERTENSION: ICD-10-CM

## 2023-04-25 DIAGNOSIS — R03.0 ELEVATED BLOOD PRESSURE READING: Primary | ICD-10-CM

## 2023-04-25 DIAGNOSIS — E66.09 CLASS 1 OBESITY DUE TO EXCESS CALORIES WITHOUT SERIOUS COMORBIDITY WITH BODY MASS INDEX (BMI) OF 32.0 TO 32.9 IN ADULT: ICD-10-CM

## 2023-04-25 DIAGNOSIS — E89.0 POSTABLATIVE HYPOTHYROIDISM: ICD-10-CM

## 2023-04-25 PROCEDURE — 3077F PR MOST RECENT SYSTOLIC BLOOD PRESSURE >= 140 MM HG: ICD-10-PCS | Mod: CPTII,S$GLB,, | Performed by: STUDENT IN AN ORGANIZED HEALTH CARE EDUCATION/TRAINING PROGRAM

## 2023-04-25 PROCEDURE — 1160F PR REVIEW ALL MEDS BY PRESCRIBER/CLIN PHARMACIST DOCUMENTED: ICD-10-PCS | Mod: CPTII,S$GLB,, | Performed by: STUDENT IN AN ORGANIZED HEALTH CARE EDUCATION/TRAINING PROGRAM

## 2023-04-25 PROCEDURE — 3008F PR BODY MASS INDEX (BMI) DOCUMENTED: ICD-10-PCS | Mod: CPTII,S$GLB,, | Performed by: STUDENT IN AN ORGANIZED HEALTH CARE EDUCATION/TRAINING PROGRAM

## 2023-04-25 PROCEDURE — 3080F DIAST BP >= 90 MM HG: CPT | Mod: CPTII,S$GLB,, | Performed by: STUDENT IN AN ORGANIZED HEALTH CARE EDUCATION/TRAINING PROGRAM

## 2023-04-25 PROCEDURE — 1160F RVW MEDS BY RX/DR IN RCRD: CPT | Mod: CPTII,S$GLB,, | Performed by: STUDENT IN AN ORGANIZED HEALTH CARE EDUCATION/TRAINING PROGRAM

## 2023-04-25 PROCEDURE — 99999 PR PBB SHADOW E&M-EST. PATIENT-LVL IV: ICD-10-PCS | Mod: PBBFAC,,, | Performed by: STUDENT IN AN ORGANIZED HEALTH CARE EDUCATION/TRAINING PROGRAM

## 2023-04-25 PROCEDURE — 3008F BODY MASS INDEX DOCD: CPT | Mod: CPTII,S$GLB,, | Performed by: STUDENT IN AN ORGANIZED HEALTH CARE EDUCATION/TRAINING PROGRAM

## 2023-04-25 PROCEDURE — 3080F PR MOST RECENT DIASTOLIC BLOOD PRESSURE >= 90 MM HG: ICD-10-PCS | Mod: CPTII,S$GLB,, | Performed by: STUDENT IN AN ORGANIZED HEALTH CARE EDUCATION/TRAINING PROGRAM

## 2023-04-25 PROCEDURE — 1159F MED LIST DOCD IN RCRD: CPT | Mod: CPTII,S$GLB,, | Performed by: STUDENT IN AN ORGANIZED HEALTH CARE EDUCATION/TRAINING PROGRAM

## 2023-04-25 PROCEDURE — 3077F SYST BP >= 140 MM HG: CPT | Mod: CPTII,S$GLB,, | Performed by: STUDENT IN AN ORGANIZED HEALTH CARE EDUCATION/TRAINING PROGRAM

## 2023-04-25 PROCEDURE — 1159F PR MEDICATION LIST DOCUMENTED IN MEDICAL RECORD: ICD-10-PCS | Mod: CPTII,S$GLB,, | Performed by: STUDENT IN AN ORGANIZED HEALTH CARE EDUCATION/TRAINING PROGRAM

## 2023-04-25 PROCEDURE — 99214 PR OFFICE/OUTPT VISIT, EST, LEVL IV, 30-39 MIN: ICD-10-PCS | Mod: S$GLB,,, | Performed by: STUDENT IN AN ORGANIZED HEALTH CARE EDUCATION/TRAINING PROGRAM

## 2023-04-25 PROCEDURE — 99999 PR PBB SHADOW E&M-EST. PATIENT-LVL IV: CPT | Mod: PBBFAC,,, | Performed by: STUDENT IN AN ORGANIZED HEALTH CARE EDUCATION/TRAINING PROGRAM

## 2023-04-25 PROCEDURE — 99214 OFFICE O/P EST MOD 30 MIN: CPT | Mod: S$GLB,,, | Performed by: STUDENT IN AN ORGANIZED HEALTH CARE EDUCATION/TRAINING PROGRAM

## 2023-04-25 NOTE — ASSESSMENT & PLAN NOTE
Lab Results   Component Value Date    TSH 0.804 02/08/2021    FREET4 1.14 02/08/2021     Last labs normal but has been >1yr since we have checked TFTs - will repeat now  Clinically having weight gain but denies other symptoms    Recheck TSH today and if normal - will continue LT4 200 mcg -- will wait to send Rx until lab results in case dose needs to be adjusted   Goal normal TSH  Avoid iatrogenic hyperthyroidism due to cardiac, bone, other risks.  As long as TSH is normal and no dose adjustment, repeat TSH annually. If he requires dose adjustment, will recheck sooner

## 2023-04-25 NOTE — PATIENT INSTRUCTIONS
We would like to check your TSH. I will also add a cholesterol panel.     I will refill your thyroid medication after the lab results.    Follow up with us on 1 year

## 2023-04-25 NOTE — PROGRESS NOTES
I have seen the patient, reviewed the Fellow's history and physical, assessment, plan, and progress note. I have personally interviewed and examined the patient at bedside and agree with the findings.     Check thyroid function.  I attempted to discuss the potential serious problems that untreated high blood pressure can cause, but he did not want to discuss it and is not interested in having this treated.    Jonn Strong MD  Endocrinology Staff

## 2023-04-25 NOTE — ASSESSMENT & PLAN NOTE
bp high today on multiple checks with manual BP cuff  Has been high consistently for Dr Allan on multiple visits in last several years - not on meds  Dr Allan discussed with the pt multiple times the harms of untreated HTN and recommendation to f/u with PCP but pt has declined every time  Today we reiterated this recommendation and stated he warrants more urgent eval due to significantly elevated DBP (160/120) -- pt adamantly refuses this  I will refer to internal med to establish with PCP - he states his endocrinologist has been his PCP and he does not have one otherwise. I explained to him that we are not his PCP and the importance of establishing with a PCP asap.   Pt asked to check cholesterol with TFTs - so this was ordered  f/u with primary care provider, monitor BP, in the meantime work on healthy diet, decrease salt. Weight loss.

## 2023-05-24 ENCOUNTER — LAB VISIT (OUTPATIENT)
Dept: LAB | Facility: OTHER | Age: 53
End: 2023-05-24
Attending: STUDENT IN AN ORGANIZED HEALTH CARE EDUCATION/TRAINING PROGRAM
Payer: COMMERCIAL

## 2023-05-24 DIAGNOSIS — R03.0 ELEVATED BLOOD PRESSURE READING: ICD-10-CM

## 2023-05-24 DIAGNOSIS — E89.0 POSTABLATIVE HYPOTHYROIDISM: ICD-10-CM

## 2023-05-24 LAB
CHOLEST SERPL-MCNC: 222 MG/DL (ref 120–199)
CHOLEST/HDLC SERPL: 4.7 {RATIO} (ref 2–5)
HDLC SERPL-MCNC: 47 MG/DL (ref 40–75)
HDLC SERPL: 21.2 % (ref 20–50)
LDLC SERPL CALC-MCNC: 151.8 MG/DL (ref 63–159)
NONHDLC SERPL-MCNC: 175 MG/DL
T4 FREE SERPL-MCNC: 0.6 NG/DL (ref 0.71–1.51)
TRIGL SERPL-MCNC: 116 MG/DL (ref 30–150)
TSH SERPL DL<=0.005 MIU/L-ACNC: 14.45 UIU/ML (ref 0.4–4)

## 2023-05-24 PROCEDURE — 84439 ASSAY OF FREE THYROXINE: CPT | Performed by: STUDENT IN AN ORGANIZED HEALTH CARE EDUCATION/TRAINING PROGRAM

## 2023-05-24 PROCEDURE — 36415 COLL VENOUS BLD VENIPUNCTURE: CPT | Performed by: STUDENT IN AN ORGANIZED HEALTH CARE EDUCATION/TRAINING PROGRAM

## 2023-05-24 PROCEDURE — 80061 LIPID PANEL: CPT | Performed by: STUDENT IN AN ORGANIZED HEALTH CARE EDUCATION/TRAINING PROGRAM

## 2023-05-24 PROCEDURE — 84443 ASSAY THYROID STIM HORMONE: CPT | Performed by: STUDENT IN AN ORGANIZED HEALTH CARE EDUCATION/TRAINING PROGRAM

## 2023-05-26 DIAGNOSIS — E89.0 POSTABLATIVE HYPOTHYROIDISM: ICD-10-CM

## 2023-05-26 RX ORDER — LEVOTHYROXINE SODIUM 200 UG/1
TABLET ORAL
Qty: 30 TABLET | Refills: 11 | Status: SHIPPED | OUTPATIENT
Start: 2023-05-26

## 2023-05-26 NOTE — PROGRESS NOTES
Spoke to pt on the phone to discuss recent labs. TFTs with high TSH and slightly low fT4 on LT4 200 mcg. He has been on this dose for years with normal TFTs and no dose adjustments. He states he does miss days sometimes and not always taking it correctly, separate from food and other meds. No dose adjustment as I suspect abnormal TFTs due to compliance issues and not dose inadequacy. Sending refill Rx. He will work on taking it consistently and correctly and we will repeat TSH in 6-8wks. Regarding lipid panel and elevated BP during his visit with me, I again reinforced my recommendation to establish care with a PCP.    Staff - please call pt to schedule labs (TSH) in 6-8 wks. Thank you so much!

## 2023-09-13 ENCOUNTER — TELEPHONE (OUTPATIENT)
Dept: UROLOGY | Facility: CLINIC | Age: 53
End: 2023-09-13
Payer: COMMERCIAL

## 2023-09-13 NOTE — TELEPHONE ENCOUNTER
----- Message from Tammy Ames NP sent at 9/13/2023 11:56 AM CDT -----  Regarding: RE: refill  The pt has not been seen since 2020. He needs a visit before refills will be sent. Thanks    ----- Message -----  From: Nicole Velasquez MA  Sent: 9/13/2023  11:40 AM CDT  To: Tammy Ames NP  Subject: FW: refill                                         ----- Message -----  From: Amanda Lee  Sent: 9/13/2023  11:05 AM CDT  To: Kwaku Munoz Staff  Subject: refill                                           Type:  RX Refill Request    Who Called: Gonzalo  Refill or New Rx:refill  RX Name and Strength:tadalafiL (CIALIS) 20 MG Tab  How is the patient currently taking it? (ex. 1XDay):  Is this a 30 day or 90 day RX:  Preferred Pharmacy with phone number:[email or web address] #[id number] - [place], [place] - [address] AT [facility]   Phone:  [phone]  Fax:  [phone]        Local or Mail Order:local  Ordering Provider:  Would the patient rather a call back or a response via [facility]? call  Best Call Back Number:[phone]  Additional Information:

## 2023-09-13 NOTE — TELEPHONE ENCOUNTER
Called patient to schedule appointment. No answer, left message. Also to inform patient RX request can't be filled until he's seen.

## 2023-11-28 ENCOUNTER — PATIENT OUTREACH (OUTPATIENT)
Dept: ADMINISTRATIVE | Facility: HOSPITAL | Age: 53
End: 2023-11-28
Payer: COMMERCIAL

## 2024-04-16 NOTE — LETTER
February 8, 2019      Rahul Sheets MD  [address] [facility] [address]           [facility]  [address] [place] [address]  Phone: [phone]  Fax: [phone]          Patient: Gonzalo Bustamante   MR Number: [id number]   YOB: 1970   Date of Visit: 2/8/2019       Dear Dr. Rahul Sheets:    Thank you for referring Gonzalo Bustamante to me for evaluation. Attached you will find relevant portions of my assessment and plan of care.    If you have questions, please do not hesitate to call me. I look forward to following Gonzalo Bustamante along with you.    Sincerely,    Frank Ramos MD    Enclosure  CC:  No Recipients    If you would like to receive this communication electronically, please contact [email or web address] or [phone] to request more information on [email or web address] Link access.    For providers and/or their staff who would like to refer a patient to [facility], please contact us through our one-stop-shop provider referral line, [facility], at [phone].    If you feel you have received this communication in error or would no longer like to receive these types of communications, please e-mail [email or web address]         
[phone]

## 2024-05-28 DIAGNOSIS — E89.0 POSTABLATIVE HYPOTHYROIDISM: ICD-10-CM

## 2024-05-28 RX ORDER — LEVOTHYROXINE SODIUM 200 UG/1
TABLET ORAL
Qty: 30 TABLET | Refills: 0 | Status: SHIPPED | OUTPATIENT
Start: 2024-05-28

## 2024-05-28 NOTE — TELEPHONE ENCOUNTER
----- Message from Luanne Nixon sent at 5/28/2024 11:04 AM CDT -----  Regarding: meds needed completly out for days  Contact: [phone]  Gonzalo Bustamante calling regarding Refills  (message) for #levothyroxine (SYNTHROID) 200 MCG tablet    Pt is schedule for a follow up visit on 6/3/24, but he is out of this medication.  Can you call him in a couple of tablets until he see you.  He stated without this mediation he gets really tired and sleepy.      [id number] - [place] [address] [facility] [address] [place] [address] [facility] [address]  Phone: [phone] Fax: [phone]

## 2024-09-13 ENCOUNTER — TELEPHONE (OUTPATIENT)
Dept: ENDOCRINOLOGY | Facility: CLINIC | Age: 54
End: 2024-09-13

## 2024-09-13 NOTE — TELEPHONE ENCOUNTER
----- Message from Jose Car sent at 9/13/2024  8:23 AM CDT -----  Regarding: Appointment/Refill  Contact: [phone]  Patient called requesting a refill on levothyroxine (SYNTHROID) 200 MCG tablet    medication and a appointment. Please contact patient as soon as possible.       [facility] #[id number] [place] [address] [facility] [facility] [address] [place] [address] [facility] [address]  Phone: [phone] Fax: [phone]

## 2024-09-16 ENCOUNTER — TELEPHONE (OUTPATIENT)
Dept: ENDOCRINOLOGY | Facility: CLINIC | Age: 54
End: 2024-09-16

## 2024-09-16 ENCOUNTER — TELEPHONE (OUTPATIENT)
Dept: INTERNAL MEDICINE | Facility: CLINIC | Age: 54
End: 2024-09-16

## 2024-09-16 DIAGNOSIS — E89.0 POSTABLATIVE HYPOTHYROIDISM: ICD-10-CM

## 2024-09-16 RX ORDER — LEVOTHYROXINE SODIUM 200 UG/1
TABLET ORAL
Qty: 14 TABLET | Refills: 0 | Status: SHIPPED | OUTPATIENT
Start: 2024-09-16

## 2024-09-16 NOTE — TELEPHONE ENCOUNTER
Patient came to  requesting a refill of synthroid and an appointment to [facility].

## 2024-09-16 NOTE — TELEPHONE ENCOUNTER
I called pt to offer an appt and discuss his medication refill. Pt declined appt and said he had a primary care appt and god his medication refill.

## 2024-10-01 ENCOUNTER — OFFICE VISIT (OUTPATIENT)
Dept: INTERNAL MEDICINE | Facility: CLINIC | Age: 54
End: 2024-10-01
Attending: INTERNAL MEDICINE

## 2024-10-01 VITALS
SYSTOLIC BLOOD PRESSURE: 190 MMHG | HEIGHT: 73 IN | WEIGHT: 244.69 LBS | BODY MASS INDEX: 32.43 KG/M2 | OXYGEN SATURATION: 95 % | DIASTOLIC BLOOD PRESSURE: 140 MMHG | HEART RATE: 74 BPM

## 2024-10-01 DIAGNOSIS — I10 HYPERTENSION, UNSPECIFIED TYPE: ICD-10-CM

## 2024-10-01 DIAGNOSIS — Z11.59 ENCOUNTER FOR HEPATITIS C SCREENING TEST FOR LOW RISK PATIENT: ICD-10-CM

## 2024-10-01 DIAGNOSIS — E89.0 POSTABLATIVE HYPOTHYROIDISM: Primary | ICD-10-CM

## 2024-10-01 DIAGNOSIS — Z11.4 ENCOUNTER FOR SCREENING FOR HIV: ICD-10-CM

## 2024-10-01 DIAGNOSIS — Z13.1 DIABETES MELLITUS SCREENING: ICD-10-CM

## 2024-10-01 DIAGNOSIS — Z13.220 LIPID SCREENING: ICD-10-CM

## 2024-10-01 PROCEDURE — 99213 OFFICE O/P EST LOW 20 MIN: CPT | Mod: PBBFAC

## 2024-10-01 PROCEDURE — 99999 PR PBB SHADOW E&M-EST. PATIENT-LVL III: CPT | Mod: PBBFAC,,,

## 2024-10-01 RX ORDER — AMLODIPINE AND OLMESARTAN MEDOXOMIL 5; 40 MG/1; MG/1
1 TABLET ORAL DAILY
Qty: 30 TABLET | Refills: 0 | Status: SHIPPED | OUTPATIENT
Start: 2024-10-01 | End: 2024-10-31

## 2024-10-01 RX ORDER — LEVOTHYROXINE SODIUM 200 UG/1
200 TABLET ORAL
Qty: 10 TABLET | Refills: 0 | Status: SHIPPED | OUTPATIENT
Start: 2024-10-01 | End: 2024-10-11

## 2024-10-01 NOTE — PROGRESS NOTES
"  History & Physical  [facility]      SUBJECTIVE:     History of Present Illness:  Patient is a 53 y.o. male presents to clinic to establish care. Current diagnoses include HTN, hypothyroidism.    #Hypothyroidism  Patient has been stable on synthroid 200mcg for 10+ years due to a hx of postablative hypothyroidism    #HTN  Patient states he has never been formally diagnosed with hypertension. However per chart review, patient has had many elevated blood pressures while in clinical settings. Blood pressure in office was 190/140 today. Patient refused recheck of blood pressure due to needing to  his children and did not allow MA to recheck pressure. Patient denying history or recent sx of chest pain, blurry vision, palpitations, and LOC. Patient stating that he would have to "cut this visit short" and would like to make new appointment for more formal establishing care visit. Patient is ok with starting blood pressure medications today as he understands his BP is very elevated today.       Review of patient's allergies indicates:   Allergen Reactions    Pcn [penicillins] Rash       Past Medical History:   Diagnosis Date    Graves disease     Hypothyroidism     Osteomyelitis of foot, right, acute 2/11/2019     Past Surgical History:   Procedure Laterality Date    BIOPSY OF LESION Right 02/11/2019    Procedure: BONE BIOPSY;  Surgeon: Madi Cifuentes DPM;  Location: [facility] OR;  Service: Podiatry;  Laterality: Right;    BUNIONECTOMY Bilateral     INCISION AND DRAINAGE FOOT Right 02/11/2019    Procedure: I&D W/BONE DEBRIDEMENT - 3RD TOE;  Surgeon: Madi Cifuentes DPM;  Location: [facility] OR;  Service: Podiatry;  Laterality: Right;  partial toe amputation bone and soft tissue cultures and tube cultures bacitracin irrigation, sagittal saw KB    KNEE SURGERY      SKIN CANCER EXCISION      melanoma back 2012    TOE AMPUTATION Right 02/11/2019    Procedure: AMPUTATION, TOE;  Surgeon: Madi CULP" "MALI Cifuentes;  Location: [facility] OR;  Service: Podiatry;  Laterality: Right;    WISDOM TOOTH EXTRACTION       Family History   Problem Relation Name Age of Onset    Cancer Father      Cancer Sister      Lymphoma Sister      Dementia Mother      Lung cancer Sister      Thyroid disease Neg Hx       Social History     Tobacco Use    Smoking status: Every Day     Current packs/day: 1.00     Types: Cigarettes    Smokeless tobacco: Never   Substance Use Topics    Alcohol use: Yes     Alcohol/week: 7.0 standard drinks of alcohol     Types: 7 Glasses of wine per week     Comment: occasional    Drug use: No        OBJECTIVE:     Vital Signs (Most Recent)  Vitals:    10/01/24 1416   BP: (!) 190/140   BP Location: Right arm   Patient Position: Sitting   Pulse: 74   SpO2: 95%   Weight: 111 kg (244 lb 11.4 oz)   Height: 6' 1" (1.854 m)         Physical Exam: Unable to complete due to patient deferment (reason: patient needed to leave appt early)      ASSESSMENT/PLAN:   53 y.o.male presents to clinic for medication refills    Gonzalo was seen today for fatigue, medication refill and establish care.    Diagnoses and all orders for this visit:    Postablative hypothyroidism  Patient with known hx of hypothyroidism and has seen endocrinology in the past. Will only send for short course of levothyroxine as patient needs to follow up with endocrinology.   -     levothyroxine (SYNTHROID) 200 MCG tablet; Take 1 tablet (200 mcg total) by mouth before breakfast. for 10 days  -     CBC W/ AUTO DIFFERENTIAL; Future  -     COMPREHENSIVE METABOLIC PANEL; Future    Encounter for hepatitis C screening test for low risk patient  -     HEPATITIS C ANTIBODY; Future    Hypertension, unspecified type  Patient was found to have greatly elevated blood pressures in clinic today at 190/140. Patient did not allow for re-check of blood pressure at the end of visit. While asymptomatic at this time, greatly recommended to patient that he needs to start a " medication today to reduce his pressures. Patient with understanding. Will start with combo SANCHEZ today. Will titrate up as necessary. Patient to also obtain BP cuff and check pressures at home. Close follow up in two weeks for titration of blood pressure medications. Strict ER precautions given today if patient develops blurry vision, nausea, vomiting, LOC, chest pain, palpitations, or pre-syncope.   -     amlodipine-olmesartan (SANCHEZ) 5-40 mg per tablet; Take 1 tablet by mouth once daily.    Lipid screening  -     LIPID PANEL; Future    Encounter for screening for HIV  -     HIV 1/2 Ag/Ab (4th Gen); Future    Diabetes mellitus screening  -     HEMOGLOBIN A1C; Future        Follow-up: 2 weeks for BP check and finishing establishment of care      Herbert Rose MD  [facility]

## 2024-10-04 ENCOUNTER — LAB VISIT (OUTPATIENT)
Dept: LAB | Facility: OTHER | Age: 54
End: 2024-10-04

## 2024-10-04 DIAGNOSIS — Z11.59 ENCOUNTER FOR HEPATITIS C SCREENING TEST FOR LOW RISK PATIENT: ICD-10-CM

## 2024-10-04 DIAGNOSIS — Z13.1 DIABETES MELLITUS SCREENING: ICD-10-CM

## 2024-10-04 DIAGNOSIS — Z13.220 LIPID SCREENING: ICD-10-CM

## 2024-10-04 DIAGNOSIS — E89.0 POSTABLATIVE HYPOTHYROIDISM: ICD-10-CM

## 2024-10-04 DIAGNOSIS — Z11.4 ENCOUNTER FOR SCREENING FOR HIV: ICD-10-CM

## 2024-10-04 LAB
ALBUMIN SERPL BCP-MCNC: 3.9 G/DL (ref 3.5–5.2)
ALP SERPL-CCNC: 67 U/L (ref 55–135)
ALT SERPL W/O P-5'-P-CCNC: 21 U/L (ref 10–44)
ANION GAP SERPL CALC-SCNC: 9 MMOL/L (ref 8–16)
AST SERPL-CCNC: 24 U/L (ref 10–40)
BASOPHILS # BLD AUTO: 0.04 K/UL (ref 0–0.2)
BASOPHILS NFR BLD: 0.6 % (ref 0–1.9)
BILIRUB SERPL-MCNC: 0.3 MG/DL (ref 0.1–1)
BUN SERPL-MCNC: 17 MG/DL (ref 6–20)
CALCIUM SERPL-MCNC: 9.1 MG/DL (ref 8.7–10.5)
CHLORIDE SERPL-SCNC: 107 MMOL/L (ref 95–110)
CHOLEST SERPL-MCNC: 201 MG/DL (ref 120–199)
CHOLEST/HDLC SERPL: 3.5 {RATIO} (ref 2–5)
CO2 SERPL-SCNC: 25 MMOL/L (ref 23–29)
CREAT SERPL-MCNC: 1.2 MG/DL (ref 0.5–1.4)
DIFFERENTIAL METHOD BLD: ABNORMAL
EOSINOPHIL # BLD AUTO: 0.2 K/UL (ref 0–0.5)
EOSINOPHIL NFR BLD: 3.5 % (ref 0–8)
ERYTHROCYTE [DISTWIDTH] IN BLOOD BY AUTOMATED COUNT: 15.9 % (ref 11.5–14.5)
EST. GFR  (NO RACE VARIABLE): >60 ML/MIN/1.73 M^2
ESTIMATED AVG GLUCOSE: 111 MG/DL (ref 68–131)
GLUCOSE SERPL-MCNC: 72 MG/DL (ref 70–110)
HBA1C MFR BLD: 5.5 % (ref 4–5.6)
HCT VFR BLD AUTO: 42.4 % (ref 40–54)
HCV AB SERPL QL IA: NEGATIVE
HDLC SERPL-MCNC: 57 MG/DL (ref 40–75)
HDLC SERPL: 28.4 % (ref 20–50)
HGB BLD-MCNC: 14 G/DL (ref 14–18)
HIV 1+2 AB+HIV1 P24 AG SERPL QL IA: NEGATIVE
IMM GRANULOCYTES # BLD AUTO: 0.04 K/UL (ref 0–0.04)
IMM GRANULOCYTES NFR BLD AUTO: 0.6 % (ref 0–0.5)
LDLC SERPL CALC-MCNC: 117.8 MG/DL (ref 63–159)
LYMPHOCYTES # BLD AUTO: 1.2 K/UL (ref 1–4.8)
LYMPHOCYTES NFR BLD: 19 % (ref 18–48)
MCH RBC QN AUTO: 30.4 PG (ref 27–31)
MCHC RBC AUTO-ENTMCNC: 33 G/DL (ref 32–36)
MCV RBC AUTO: 92 FL (ref 82–98)
MONOCYTES # BLD AUTO: 0.5 K/UL (ref 0.3–1)
MONOCYTES NFR BLD: 7.1 % (ref 4–15)
NEUTROPHILS # BLD AUTO: 4.5 K/UL (ref 1.8–7.7)
NEUTROPHILS NFR BLD: 69.2 % (ref 38–73)
NONHDLC SERPL-MCNC: 144 MG/DL
NRBC BLD-RTO: 0 /100 WBC
PLATELET # BLD AUTO: 309 K/UL (ref 150–450)
PMV BLD AUTO: 9 FL (ref 9.2–12.9)
POTASSIUM SERPL-SCNC: 4.2 MMOL/L (ref 3.5–5.1)
PROT SERPL-MCNC: 6.7 G/DL (ref 6–8.4)
RBC # BLD AUTO: 4.6 M/UL (ref 4.6–6.2)
SODIUM SERPL-SCNC: 141 MMOL/L (ref 136–145)
TRIGL SERPL-MCNC: 131 MG/DL (ref 30–150)
WBC # BLD AUTO: 6.49 K/UL (ref 3.9–12.7)

## 2024-10-04 PROCEDURE — 85025 COMPLETE CBC W/AUTO DIFF WBC: CPT

## 2024-10-04 PROCEDURE — 83036 HEMOGLOBIN GLYCOSYLATED A1C: CPT

## 2024-10-04 PROCEDURE — 80053 COMPREHEN METABOLIC PANEL: CPT

## 2024-10-04 PROCEDURE — 87389 HIV-1 AG W/HIV-1&-2 AB AG IA: CPT

## 2024-10-04 PROCEDURE — 80061 LIPID PANEL: CPT

## 2024-10-04 PROCEDURE — 36415 COLL VENOUS BLD VENIPUNCTURE: CPT

## 2024-10-04 PROCEDURE — 86803 HEPATITIS C AB TEST: CPT

## 2024-10-15 ENCOUNTER — OFFICE VISIT (OUTPATIENT)
Dept: INTERNAL MEDICINE | Facility: CLINIC | Age: 54
End: 2024-10-15

## 2024-10-15 VITALS
OXYGEN SATURATION: 97 % | WEIGHT: 237.88 LBS | HEART RATE: 71 BPM | HEIGHT: 73 IN | SYSTOLIC BLOOD PRESSURE: 136 MMHG | DIASTOLIC BLOOD PRESSURE: 110 MMHG | BODY MASS INDEX: 31.53 KG/M2

## 2024-10-15 DIAGNOSIS — E89.0 POSTABLATIVE HYPOTHYROIDISM: Primary | ICD-10-CM

## 2024-10-15 DIAGNOSIS — Z12.11 SCREENING FOR COLORECTAL CANCER: ICD-10-CM

## 2024-10-15 DIAGNOSIS — I10 HYPERTENSION, UNSPECIFIED TYPE: ICD-10-CM

## 2024-10-15 DIAGNOSIS — Z12.12 SCREENING FOR COLORECTAL CANCER: ICD-10-CM

## 2024-10-15 PROCEDURE — 99999 PR PBB SHADOW E&M-EST. PATIENT-LVL III: CPT | Mod: PBBFAC,,,

## 2024-10-15 PROCEDURE — 99213 OFFICE O/P EST LOW 20 MIN: CPT | Mod: PBBFAC

## 2024-10-15 RX ORDER — LEVOTHYROXINE SODIUM 200 UG/1
200 TABLET ORAL
Qty: 30 TABLET | Refills: 0 | Status: SHIPPED | OUTPATIENT
Start: 2024-10-15 | End: 2024-11-14

## 2024-10-15 RX ORDER — AMLODIPINE AND OLMESARTAN MEDOXOMIL 10; 20 MG/1; MG/1
1 TABLET ORAL DAILY
Qty: 60 TABLET | Refills: 0 | Status: SHIPPED | OUTPATIENT
Start: 2024-10-15 | End: 2024-12-14

## 2024-10-15 NOTE — PROGRESS NOTES
[facility]  Progress Note    SUBJECTIVE:     Chief Complaint:   Chief Complaint   Patient presents with    Hypertension    Medication Refill       History of Present Illness:  53 y.o. male who  has a past medical history of Graves disease, Hypothyroidism, and Osteomyelitis of foot, right, acute (2/11/2019). HTN presents to clinic today for follow up    #HTN  Patient previously came to establish care. He was found to have a greatly elevated BP at 190/140 but asymptomatic. Due to the elevate BP patient was started on amlodipine-olmesartan (SANCHEZ) 5-40 mg. Patient has been adherent to medication but was also instructed to take BP log which he did not. Labs were obtained at last visit but patient had to leave appointment early so establishing care visit was not completed. Initial BP noted at 138/104 at today's visit. Patient denying symptoms of chest pain, palpations, or LOC.    #Hx of postablative hypothyroidism  Patient with hx of postablative hypothyroidism. Has been taking levothyroxine 200mcg for many years and stable on this dosage. Requesting refill today. Has not been able to follow up with endocrinology.      Smoker: pack day smoker now transition to half pack a day. Smoker 30 pack year  Not interested in smoking cessation medication (Schedule for LDCT when patient able to obtain insurance)  Drink: weekend socially (drinking on the weekends)  Drug use: No drug use     Allergies:  Review of patient's allergies indicates:   Allergen Reactions    Pcn [penicillins] Rash       Home Medications:  Current Outpatient Medications on File Prior to Visit   Medication Sig    [DISCONTINUED] amlodipine-olmesartan (SANCHEZ) 5-40 mg per tablet Take 1 tablet by mouth once daily.    [DISCONTINUED] levothyroxine (SYNTHROID) 200 MCG tablet Take 1 tablet (200 mcg total) by mouth before breakfast. for 10 days    ascorbic acid, vitamin C, (VITAMIN C) 1000 MG tablet Take 1,000 mg by mouth. (Patient not taking:  Reported on 10/15/2024)    omega-3 fatty acids 1,000 mg Cap Take 2 g by mouth. (Patient not taking: Reported on 10/15/2024)    tadalafiL (CIALIS) 20 MG Tab Take 1 tablet (20 mg total) by mouth every 72 hours as needed (ED). (Patient not taking: Reported on 10/15/2024)     No current facility-administered medications on file prior to visit.       Past Medical History:   Diagnosis Date    Graves disease     Hypothyroidism     Osteomyelitis of foot, right, acute 2/11/2019     Past Surgical History:   Procedure Laterality Date    BIOPSY OF LESION Right 02/11/2019    Procedure: BONE BIOPSY;  Surgeon: Madi Cifuentes DPM;  Location: [facility] OR;  Service: Podiatry;  Laterality: Right;    BUNIONECTOMY Bilateral     INCISION AND DRAINAGE FOOT Right 02/11/2019    Procedure: I&D W/BONE DEBRIDEMENT - 3RD TOE;  Surgeon: Madi Cifuentes DPM;  Location: [facility] OR;  Service: Podiatry;  Laterality: Right;  partial toe amputation bone and soft tissue cultures and tube cultures bacitracin irrigation, sagittal saw KB    KNEE SURGERY      SKIN CANCER EXCISION      melanoma back 2012    TOE AMPUTATION Right 02/11/2019    Procedure: AMPUTATION, TOE;  Surgeon: Madi Cifuentes DPM;  Location: [facility] OR;  Service: Podiatry;  Laterality: Right;    WISDOM TOOTH EXTRACTION       Family History   Problem Relation Name Age of Onset    Cancer Father      Cancer Sister      Lymphoma Sister      Dementia Mother      Lung cancer Sister      Thyroid disease Neg Hx       Social History     Tobacco Use    Smoking status: Every Day     Current packs/day: 1.00     Types: Cigarettes    Smokeless tobacco: Never   Substance Use Topics    Alcohol use: Yes     Alcohol/week: 7.0 standard drinks of alcohol     Types: 7 Glasses of wine per week     Comment: occasional    Drug use: No          OBJECTIVE:     Vital Signs:  Pulse: 71 (10/15/24 1038)  BP: (!) 136/110 (10/15/24 1116)  SpO2: 97 % (10/15/24 1038)    Physical Exam  Constitutional:       General: He is not in  acute distress.     Appearance: Normal appearance. He is not toxic-appearing.   HENT:      Head: Normocephalic and atraumatic.      Right Ear: External ear normal.      Left Ear: External ear normal.      Nose: Nose normal.      Mouth/Throat:      Mouth: Mucous membranes are moist.   Eyes:      Extraocular Movements: Extraocular movements intact.   Cardiovascular:      Rate and Rhythm: Normal rate and regular rhythm.      Pulses: Normal pulses.      Heart sounds: Normal heart sounds.   Pulmonary:      Effort: Pulmonary effort is normal. No respiratory distress.   Abdominal:      General: Abdomen is flat.      Palpations: Abdomen is soft.      Tenderness: There is no abdominal tenderness.   Musculoskeletal:      Cervical back: Normal range of motion and neck supple.   Skin:     General: Skin is warm.      Findings: No bruising or erythema.   Neurological:      General: No focal deficit present.      Mental Status: He is alert.   Psychiatric:         Mood and Affect: Mood normal.         Laboratory:  Hemoglobin A1C   Date Value Ref Range Status   10/04/2024 5.5 4.0 - 5.6 % Final     Comment:     ADA Screening Guidelines:  5.7-6.4%  Consistent with prediabetes  >or=6.5%  Consistent with diabetes    High levels of fetal hemoglobin interfere with the HbA1C  assay. Heterozygous hemoglobin variants (HbS, HgC, etc)do  not significantly interfere with this assay.   However, presence of multiple variants may affect accuracy.         A/P:  Gonzalo was seen today for hypertension and medication refill.    Evaluated patient's eligibility for lung cancer screening based on 30 pack-year smoking history   Patient deferring at this time due to insurance reasons    Diagnoses and all orders for this visit:    Postablative hypothyroidism  Will refill for short course of levothyroxine, however patient to need follow up with endocrinology and updated TSH/T4 levels  -     Ambulatory referral/consult to Endocrinology; Future  -      levothyroxine (SYNTHROID) 200 MCG tablet; Take 1 tablet (200 mcg total) by mouth before breakfast.    Screening for colorectal cancer  -     Cologuard Screening (Multitarget Stool DNA); Future  -     Cologuard Screening (Multitarget Stool DNA)    Hypertension, unspecified type  Upon BP recheck patient was found to have elevated BP of 136/110. Still not under optimal control. Patient left visit prior to re-assessment with this provider. Will send for increased dosage of SANCHEZ and notify patient to check pressures at home. Close follow up still needed for BP control  -     amlodipine-olmesartan (SANCHEZ) 10-20 mg per tablet; Take 1 tablet by mouth once daily.    This note was generated with the assistance of ambient listening technology. Verbal consent was obtained by the patient and accompanying visitor(s) for the recording of patient appointment to facilitate this note. I attest to having reviewed and edited the generated note for accuracy, though some syntax or spelling errors may persist. Please contact the author of this note for any clarification.      Follow up in 1 month    Herbert Rose MD   Family Medicine   [facility]

## 2024-10-16 ENCOUNTER — TELEPHONE (OUTPATIENT)
Dept: INTERNAL MEDICINE | Facility: CLINIC | Age: 54
End: 2024-10-16

## 2024-10-16 NOTE — TELEPHONE ENCOUNTER
Called patient and LVM to inform that Dr. Rose has increased his BP medicine and that he can pick It up at his requested pharmacy and continue taking it. Also informed that Dr. oRse has advised to get a BP cuff and to monitor BP at home. Instructed patient to call back or send [employer] message to schedule a 1m F/u for Hypothyroidism and medication refill.

## 2024-11-13 ENCOUNTER — PATIENT MESSAGE (OUTPATIENT)
Dept: ADMINISTRATIVE | Facility: HOSPITAL | Age: 54
End: 2024-11-13

## 2024-11-21 ENCOUNTER — PATIENT MESSAGE (OUTPATIENT)
Dept: ADMINISTRATIVE | Facility: HOSPITAL | Age: 54
End: 2024-11-21

## 2024-11-30 DIAGNOSIS — E89.0 POSTABLATIVE HYPOTHYROIDISM: ICD-10-CM

## 2024-12-02 RX ORDER — LEVOTHYROXINE SODIUM 200 UG/1
TABLET ORAL
Qty: 30 TABLET | Refills: 0 | Status: SHIPPED | OUTPATIENT
Start: 2024-12-02

## 2024-12-02 NOTE — TELEPHONE ENCOUNTER
Refill Encounter  Allergies: Confirmed    PCP Visits: Recent Visits  10/01/24  10/15/24    Future Appointments  No visits were found meeting these conditions.  Showing future appointments within next 720 days and meeting all other requirements     Last 3 Blood Pressure:   BP Readings from Last 3 Encounters:   10/15/24 (!) 136/110   10/01/24 (!) 190/140   04/25/23 (!) 160/120     Preferred Pharmacy:   [id number] [place] [address] [email or web address] [place] [address] [email or web address] [address]  Phone: [phone] Fax: [phone]    Requested RX:  Requested Prescriptions     Pending Prescriptions Disp Refills    levothyroxine (SYNTHROID) 200 MCG tablet [Pharmacy Med Name: LEVOTHYROXINE 0.2MG (200MCG) TAB] 30 tablet 0     Sig: TAKE 1 TABLET(200 MCG) BY MOUTH BEFORE BREAKFAST      RX Route: Normal     No

## 2024-12-04 ENCOUNTER — TELEPHONE (OUTPATIENT)
Dept: INTERNAL MEDICINE | Facility: CLINIC | Age: 54
End: 2024-12-04

## 2024-12-04 NOTE — TELEPHONE ENCOUNTER
Called patient via telephone call but there was no answer. LVM for patient to return call to schedule 1 month F/u with Dr. Rose for Hypothyroidism and med refill as well as a 2w nurse in-person BP check

## 2024-12-04 NOTE — TELEPHONE ENCOUNTER
Called patient once again via telephone call but there was no answer. LVM for patient to return call to schedule 1 month F/u with Dr. Rose for Hypothyroidism and for med refill as well as a 2w nurse in-person BP check

## 2024-12-11 ENCOUNTER — PATIENT MESSAGE (OUTPATIENT)
Dept: ADMINISTRATIVE | Facility: HOSPITAL | Age: 54
End: 2024-12-11

## 2025-01-08 DIAGNOSIS — I10 HYPERTENSION, UNSPECIFIED TYPE: ICD-10-CM

## 2025-01-08 DIAGNOSIS — E89.0 POSTABLATIVE HYPOTHYROIDISM: ICD-10-CM

## 2025-01-09 NOTE — TELEPHONE ENCOUNTER
Refill Encounter    PCP Visits: Recent Visits  No visits were found meeting these conditions.  Showing recent visits within past 360 days and meeting all other requirements  Future Appointments  No visits were found meeting these conditions.  Showing future appointments within next 720 days and meeting all other requirements     Last 3 Blood Pressure:   BP Readings from Last 3 Encounters:   10/15/24 (!) 136/110   10/01/24 (!) 190/140   04/25/23 (!) 160/120     Preferred Pharmacy:   [id number] [address] & [address] [address]  Phone: [phone] Fax: [phone]      Requested RX:  Requested Prescriptions     Pending Prescriptions Disp Refills    amlodipine-olmesartan (SANCHEZ) 10-20 mg per tablet [Pharmacy Med Name: AMLODIPINE/OLMES MEDOXOM 10-20MG T] 60 tablet 0     Sig: TAKE 1 TABLET BY MOUTH DAILY    levothyroxine (SYNTHROID) 200 MCG tablet [Pharmacy Med Name: LEVOTHYROXINE 0.2MG (200MCG) TAB] 30 tablet 0     Sig: TAKE 1 TABLET(200 MCG) BY MOUTH BEFORE BREAKFAST      RX Route: Normal

## 2025-01-26 DIAGNOSIS — E89.0 POSTABLATIVE HYPOTHYROIDISM: ICD-10-CM

## 2025-01-26 DIAGNOSIS — I10 HYPERTENSION, UNSPECIFIED TYPE: ICD-10-CM

## 2025-01-26 DIAGNOSIS — N52.01 ERECTILE DYSFUNCTION DUE TO ARTERIAL INSUFFICIENCY: ICD-10-CM

## 2025-01-26 RX ORDER — LEVOTHYROXINE SODIUM 200 UG/1
TABLET ORAL
Qty: 30 TABLET | Refills: 0 | Status: CANCELLED | OUTPATIENT
Start: 2025-01-26

## 2025-01-26 RX ORDER — AMLODIPINE AND OLMESARTAN MEDOXOMIL 10; 20 MG/1; MG/1
1 TABLET ORAL DAILY
Qty: 60 TABLET | Refills: 0 | Status: CANCELLED | OUTPATIENT
Start: 2025-01-26 | End: 2025-03-27

## 2025-01-27 RX ORDER — LEVOTHYROXINE SODIUM 200 UG/1
TABLET ORAL
Qty: 30 TABLET | Refills: 0 | OUTPATIENT
Start: 2025-01-27

## 2025-01-27 RX ORDER — TADALAFIL 20 MG/1
20 TABLET ORAL
Qty: 30 TABLET | Refills: 11 | OUTPATIENT
Start: 2025-01-27

## 2025-01-27 RX ORDER — AMLODIPINE AND OLMESARTAN MEDOXOMIL 10; 20 MG/1; MG/1
1 TABLET ORAL
Qty: 60 TABLET | Refills: 0 | Status: SHIPPED | OUTPATIENT
Start: 2025-01-27

## 2025-01-27 NOTE — TELEPHONE ENCOUNTER
amlodipine-olmesartan (SANCHEZ) 10-20 mg refilled in separate encounter.     levothyroxine (SYNTHROID) 200 MCG refill denied as Pt. Sees endo 1/28

## 2025-03-31 ENCOUNTER — PATIENT MESSAGE (OUTPATIENT)
Dept: ADMINISTRATIVE | Facility: HOSPITAL | Age: 55
End: 2025-03-31

## 2025-04-01 DIAGNOSIS — I10 HYPERTENSION, UNSPECIFIED TYPE: ICD-10-CM

## 2025-04-01 DIAGNOSIS — E89.0 POSTABLATIVE HYPOTHYROIDISM: ICD-10-CM

## 2025-04-01 RX ORDER — LEVOTHYROXINE SODIUM 200 UG/1
TABLET ORAL
Qty: 30 TABLET | Refills: 0 | Status: CANCELLED | OUTPATIENT
Start: 2025-04-01

## 2025-04-01 NOTE — TELEPHONE ENCOUNTER
Refill Encounter  Allergies: Confirmed    PCP Visits: Recent Visits  LOV - 10/15/25    Future Appointments  Date Type Provider Dept   04/07/25 Appointment Herbert Rose MD [facility]   Showing future appointments within next 720 days and meeting all other requirements     Last 3 Blood Pressure:   BP Readings from Last 3 Encounters:   10/15/24 (!) 136/110   10/01/24 (!) 190/140   04/25/23 (!) 160/120     Preferred Pharmacy:   [id number] [place] [address] [facility] [place] [address] [facility] [address]  Phone: [phone] Fax: [phone]    Requested RX:  Requested Prescriptions     Pending Prescriptions Disp Refills    amlodipine-olmesartan (SANCHEZ) 10-20 mg per tablet 60 tablet 2     Sig: Take 1 tablet by mouth once daily.      RX Route: Normal

## 2025-04-03 DIAGNOSIS — E89.0 POSTABLATIVE HYPOTHYROIDISM: ICD-10-CM

## 2025-04-04 RX ORDER — LEVOTHYROXINE SODIUM 200 UG/1
TABLET ORAL
Qty: 30 TABLET | Refills: 0 | Status: SHIPPED | OUTPATIENT
Start: 2025-04-04

## 2025-04-04 RX ORDER — AMLODIPINE AND OLMESARTAN MEDOXOMIL 10; 20 MG/1; MG/1
1 TABLET ORAL DAILY
Qty: 60 TABLET | Refills: 2 | Status: SHIPPED | OUTPATIENT
Start: 2025-04-04 | End: 2025-10-01

## 2025-04-04 NOTE — TELEPHONE ENCOUNTER
Refill Encounter    PCP Visits: Recent Visits  Date Type Provider Dept   10/15/24 Office Visit Herbert Rose MD [facility]   10/01/24 Office Visit Herbert Rose MD [facility]   Showing recent visits within past 360 days and meeting all other requirements  Future Appointments  Date Type Provider Dept   04/07/25 Appointment Herbert Rose MD [facility]   Showing future appointments within next 720 days and meeting all other requirements      Last 3 Blood Pressure:   BP Readings from Last 3 Encounters:   10/15/24 (!) 136/110   10/01/24 (!) 190/140   04/25/23 (!) 160/120     Preferred Pharmacy:   [facility] #[id number] [place] [address] [address] [facility] & [place] [address] [facility] [address]  Phone: [phone] Fax: [phone]    Requested RX:  Requested Prescriptions     Pending Prescriptions Disp Refills    levothyroxine (SYNTHROID) 200 MCG tablet [Pharmacy Med Name: LEVOTHYROXINE 0.2MG (200MCG) TAB] 30 tablet 0     Sig: TAKE 1 TABLET(200 MCG) BY MOUTH BEFORE BREAKFAST      RX Route: Normal

## 2025-04-28 ENCOUNTER — OFFICE VISIT (OUTPATIENT)
Dept: URGENT CARE | Facility: CLINIC | Age: 55
End: 2025-04-28

## 2025-04-28 VITALS
HEIGHT: 73 IN | DIASTOLIC BLOOD PRESSURE: 95 MMHG | RESPIRATION RATE: 20 BRPM | BODY MASS INDEX: 31.41 KG/M2 | TEMPERATURE: 98 F | OXYGEN SATURATION: 99 % | HEART RATE: 74 BPM | SYSTOLIC BLOOD PRESSURE: 157 MMHG | WEIGHT: 237 LBS

## 2025-04-28 DIAGNOSIS — R20.0 RIGHT LEG NUMBNESS: Primary | ICD-10-CM

## 2025-04-28 PROCEDURE — 99499 UNLISTED E&M SERVICE: CPT | Mod: S$GLB,,, | Performed by: NURSE PRACTITIONER

## 2025-04-29 NOTE — PROGRESS NOTES
"Subjective:      Patient ID: Gonzalo Bustamante is a 54 y.o. male.    Vitals:  height is 6' 1" (1.854 m) and weight is 107.5 kg (237 lb). His temperature is 98.3 °F (36.8 °C). His blood pressure is 157/95 (abnormal) and his pulse is 74. His respiration is 20 and oxygen saturation is 99%.     Chief Complaint: Leg Pain    Pt presents with complaint of right leg pain/numbness x1-2 hours ago.  Pt states he was driving and states he noticed his leg became numb.  Pt states he had to stop an stretch his leg and states he could barely walk during the episode.  Pt states he has not taken any medications for his symptoms.  Pt states he started to feel much better when sitting in the waiting room.  Pt states he is able to walk normally and has full feeling in his leg    Provider note begins below    Patient states leg felt better while sitting in waiting room and walking around.  Denies any history of DVT or PE.    Leg Pain   The incident occurred 1 to 3 hours ago. The incident occurred in the street. There was no injury mechanism. The pain is present in the right leg. Quality: numbness. The pain is at a severity of 5/10. The pain is moderate. The pain has been Fluctuating since onset. Associated symptoms include a loss of sensation, muscle weakness and numbness. He reports no foreign bodies present. The symptoms are aggravated by weight bearing and movement. He has tried nothing for the symptoms. The treatment provided no relief.       Neurological:  Positive for numbness.      Objective:     Physical Exam   Constitutional: He is oriented to person, place, and time.   HENT:   Head: Normocephalic and atraumatic.   Cardiovascular: Normal rate.   Pulmonary/Chest: Effort normal. No respiratory distress.   Abdominal: Normal appearance.   Neurological: He is alert and oriented to person, place, and time.   Skin: Skin is dry.   Psychiatric: His behavior is normal. Mood normal.         Gait still abnormal  Assessment:     1. " Right leg numbness        Plan:   Patient would like to cancel visit    ED precautions           Right leg numbness

## 2025-05-20 ENCOUNTER — OFFICE VISIT (OUTPATIENT)
Dept: ENDOCRINOLOGY | Facility: CLINIC | Age: 55
End: 2025-05-20

## 2025-05-20 VITALS
SYSTOLIC BLOOD PRESSURE: 118 MMHG | DIASTOLIC BLOOD PRESSURE: 84 MMHG | HEIGHT: 73 IN | WEIGHT: 224.88 LBS | BODY MASS INDEX: 29.8 KG/M2

## 2025-05-20 DIAGNOSIS — E89.0 POSTABLATIVE HYPOTHYROIDISM: ICD-10-CM

## 2025-05-20 PROCEDURE — 99999 PR PBB SHADOW E&M-EST. PATIENT-LVL III: CPT | Mod: PBBFAC,,, | Performed by: STUDENT IN AN ORGANIZED HEALTH CARE EDUCATION/TRAINING PROGRAM

## 2025-05-20 PROCEDURE — 99214 OFFICE O/P EST MOD 30 MIN: CPT | Mod: S$PBB,,, | Performed by: STUDENT IN AN ORGANIZED HEALTH CARE EDUCATION/TRAINING PROGRAM

## 2025-05-20 PROCEDURE — 99213 OFFICE O/P EST LOW 20 MIN: CPT | Mod: PBBFAC | Performed by: STUDENT IN AN ORGANIZED HEALTH CARE EDUCATION/TRAINING PROGRAM

## 2025-05-20 PROCEDURE — G2211 COMPLEX E/M VISIT ADD ON: HCPCS | Mod: S$PBB,,, | Performed by: STUDENT IN AN ORGANIZED HEALTH CARE EDUCATION/TRAINING PROGRAM

## 2025-05-20 RX ORDER — LEVOTHYROXINE SODIUM 200 UG/1
200 TABLET ORAL
Qty: 90 TABLET | Refills: 0 | Status: SHIPPED | OUTPATIENT
Start: 2025-05-20

## 2025-05-20 NOTE — PROGRESS NOTES
"Endocrinology Return Visit  05/20/2025      Subjective:      Chief Complaint: Hypothyroidism    HPI: Gonzalo Bustamante is a 54 y.o. male who is here for a follow-up evaluation for hypothyroidism.    He was last seen in 4/2023. At that time he was on LT4 200 mcg daily and over-due for updated TSH.   Additionally, during that visit his BP was significantly elevated 160/120. We recommended ED evaluation right away based on very high DBP. He adamantly refused however. We also urged PCP f/u asap for HTN.    TSH checked at time of his last visit was elevated, 14. I spoke to him on the phone and clarified compliance with LT4. He told me missing some days and other days not taking correctly, separate from food and other meds. Additionally, 200 mcg was a higher dose than I would expect for his weight and TSH had been consistently normal on this dose in the past, therefore we decided to continue 200 mcg daily, counseled on proper and consistent adherence with plan to recheck TSH in 6-8 wks. It does not look like TSH was ever repeated.      Now on anti-HTN and BP today is much better.    He reports he ran out of LT4 2 days ago.   He reports "not great about taking thyroid meds consistently"      With regards to his hypothyroidism:  Hx Graves disease. Treated with NAGY at 19 years old, now with post-ablative hypothyroidism.    Current medication:  levothyroxine  Current dose: 200 mcg - no recent dose adjustments     Pt takes thyroid medication in the early morning on an empty stomach mostly. Takes with other AM meds.  He does miss doses       Had thyroid US 12/2018, no nodules    Lab Results   Component Value Date    TSH 14.445 (H) 05/24/2023    FREET4 0.60 (L) 05/24/2023     Symptoms  No   Yes  [x]    [x]  Weight gain; he states he has had some weight loss  Wt Readings from Last 10 Encounters:   05/20/25 102 kg (224 lb 13.9 oz)   04/28/25 107.5 kg (237 lb)   10/15/24 107.9 kg (237 lb 14 oz)   10/01/24 111 kg (244 lb 11.4 " oz)   04/25/23 113.9 kg (250 lb 15.9 oz)   03/04/22 108.9 kg (240 lb)   09/29/21 108.9 kg (240 lb)   05/14/21 113 kg (249 lb 1.9 oz)   07/27/20 110.5 kg (243 lb 9.7 oz)   02/05/20 110.5 kg (243 lb 8 oz)     [x]    []  Fatigue  [x]    []  Constipation  [x]    []  Cold intolerance  []    [x]  Weight loss -- intentional   [x]    []  Insomnia  [x]    []  Diarrhea  [x]    []  Heat intolerance  [x]    []  Palpitations  [x]    []  Tremor  [x]    []  Vision changes  Feeling okay overall.    Reviewed past medical, family, social history and updated as appropriate.    ROS: As above    Objective:     Vitals:    05/20/25 1404   BP: 118/84       BP Readings from Last 5 Encounters:   05/20/25 118/84   04/28/25 (!) 157/95   10/15/24 (!) 136/110   10/01/24 (!) 190/140   04/25/23 (!) 160/120     Physical Exam  Vitals reviewed.   Constitutional:       General: He is not in acute distress.     Appearance: He is not ill-appearing.   HENT:      Mouth/Throat:      Mouth: Mucous membranes are moist.   Eyes:      Conjunctiva/sclera: Conjunctivae normal.   Neck:      Thyroid: No thyromegaly.   Pulmonary:      Effort: Pulmonary effort is normal.   Neurological:      Mental Status: He is alert and oriented to person, place, and time.   Psychiatric:         Mood and Affect: Mood normal.         Behavior: Behavior normal.       Wt Readings from Last 10 Encounters:   05/20/25 1404 102 kg (224 lb 13.9 oz)   04/28/25 1901 107.5 kg (237 lb)   10/15/24 1038 107.9 kg (237 lb 14 oz)   10/01/24 1416 111 kg (244 lb 11.4 oz)   04/25/23 0938 113.9 kg (250 lb 15.9 oz)   03/04/22 1001 108.9 kg (240 lb)   09/29/21 1002 108.9 kg (240 lb)   05/14/21 1422 113 kg (249 lb 1.9 oz)   07/27/20 1541 110.5 kg (243 lb 9.7 oz)   02/05/20 1421 110.5 kg (243 lb 8 oz)       Lab Results   Component Value Date     10/04/2024    K 4.2 10/04/2024     10/04/2024    CO2 25 10/04/2024    GLU 72 10/04/2024    BUN 17 10/04/2024    CREATININE 1.2 10/04/2024    CALCIUM  9.1 10/04/2024    PROT 6.7 10/04/2024    ALBUMIN 3.9 10/04/2024    BILITOT 0.3 10/04/2024    ALKPHOS 67 10/04/2024    AST 24 10/04/2024    ALT 21 10/04/2024    ANIONGAP 9 10/04/2024    TSH 14.445 (H) 05/24/2023      Assessment/Plan:     1. Postablative hypothyroidism  -     TSH; Future; Expected date: 07/01/2025  -     levothyroxine (SYNTHROID) 200 MCG tablet; Take 1 tablet (200 mcg total) by mouth before breakfast.  Dispense: 90 tablet; Refill: 0        HYPOTHYROIDISM:  - Taking LT4 200 mcg daily -- on same dose for years, however not  taking correctly (takes with AM meds) and not taking consistently -- misses doses here and there and ran out completely a few days ago  - reviewed how to properly take LT4 as well as the need for consistent and correct dosing for 6wks before we repeat labs in order to get an accurate assessment of current LT4 dose  - He has had some weight loss which we discussed may reduce his LT4 requirement.   - Explained importance of consistent thyroid medication dosing for accurate lab interpretation.  - Clarified proper timing of thyroid medication administration in relation to other medications and food.  - Continue levothyroxine 200 mcg daily. Take on an empty stomach in the morning, at least 30 minutes before other medications or food.  - Ordered thyroid stimulating hormone (TSH) test to be performed in 6 weeks.  - I sent 90 day Rx refill of LT4 200 mcg today with 0 refills -- need to see updated TSH before sending Rx for longer period of time.    HYPERTENSION:  - Evaluated BP, noting it appears well-controlled on current medication - much better than his last visit with me.  - Continue to follow with PCP for management of HTN.        TSH in 6 wks  Recall for RTC 1yr     Tammy Rivas MD  [facility]     Visit today included increased complexity associated with the care of the problems addressed and managing the longitudinal care of the patient due to the serious and/or complex  managed problems    This note was generated with the assistance of ambient listening technology. Verbal consent was obtained by the patient and accompanying visitor(s) for the recording of patient appointment to facilitate this note. I attest to having reviewed and edited the generated note for accuracy, though some syntax or spelling errors may persist. Please contact the author of this note for any clarification.

## 2025-05-20 NOTE — PATIENT INSTRUCTIONS
Continue levothyroxine 200 mcg daily\    As a review, it is best to take the levothyroxine in the morning on an empty stomach, and not eat, drink or take medications for at least ~30 minutes after taking it to maximize its absorption.  Please avoid taking any multi-vitamins (anything with iron) or calcium supplements for at least 4 hours after taking the medication.  If you miss the dose on one day, take two doses the next morning to make up for the missed dose    You should ideally be on this consistently every day and taking correctly for 6 weeks before we repeat labs to make sure this dose is still appropriate.     Follow up in 1 year. Our schedules open 4mo in advance. This means set a reminder to call us in early January to schedule for next May 2026.

## 2025-07-17 ENCOUNTER — PATIENT OUTREACH (OUTPATIENT)
Dept: ADMINISTRATIVE | Facility: HOSPITAL | Age: 55
End: 2025-07-17

## 2025-07-17 NOTE — PROGRESS NOTES
Health Maintenance Due   Topic Date Due    TETANUS VACCINE  Never done    Shingles Vaccine (1 of 2) Never done    Pneumococcal Vaccines (Age 50+) (1 of 2 - PCV) Never done    Colorectal Cancer Screening  Never done    COVID-19 Vaccine (3 - Pfizer risk series) 05/12/2021   Portal msg sent to pt as a reminder of soon to be annual in October and to return cologuard kit.

## 2025-08-19 DIAGNOSIS — E89.0 POSTABLATIVE HYPOTHYROIDISM: ICD-10-CM

## 2025-08-20 RX ORDER — LEVOTHYROXINE SODIUM 200 UG/1
TABLET ORAL
Qty: 30 TABLET | Refills: 0 | Status: SHIPPED | OUTPATIENT
Start: 2025-08-20

## (undated) DEVICE — SEE MEDLINE ITEM 154981

## (undated) DEVICE — BANDAGE DERMACEA STRETCH 4X1IN

## (undated) DEVICE — ELECTRODE REM PLYHSV RETURN 9

## (undated) DEVICE — DRAPE PLASTIC U 60X72

## (undated) DEVICE — SEE L#120831

## (undated) DEVICE — DRESSING XEROFORM 1X8IN

## (undated) DEVICE — BLADE SCALP OPHTL BEVEL STR

## (undated) DEVICE — SUT PROLENE 4-0 MONO 18IN

## (undated) DEVICE — SUT MCRYL PLUS 4-0 PS2 27IN

## (undated) DEVICE — NDL 18GA X1 1/2 REG BEVEL

## (undated) DEVICE — GAUZE SPONGE 4X4 12PLY

## (undated) DEVICE — BLADE SURG #15 CARBON STEEL

## (undated) DEVICE — APPLICATOR CHLORAPREP ORN 26ML

## (undated) DEVICE — SEE MEDLINE ITEM 146308

## (undated) DEVICE — COVER OVERHEAD SURG LT BLUE

## (undated) DEVICE — SEE MEDLINE ITEM 152523

## (undated) DEVICE — SYR 10CC LUER LOCK

## (undated) DEVICE — SUT VICRYL 4-0 27 SH

## (undated) DEVICE — GLOVE BIOGEL ECLIPSE SZ 7.5

## (undated) DEVICE — PAD PREP 50/CA

## (undated) DEVICE — NDL HYPO REG 25G X 1 1/2

## (undated) DEVICE — DRESSING N ADH OIL EMUL 3X3

## (undated) DEVICE — SEE MEDLINE ITEM 156953

## (undated) DEVICE — SPONGE DERMACEA GAUZE 4X4

## (undated) DEVICE — UNDERGLOVES BIOGEL PI SIZE 8

## (undated) DEVICE — STRIP PACKING ANTIMIC 1/4X1

## (undated) DEVICE — NDL HYPODERMIC BLUNT 18G 1.5IN